# Patient Record
Sex: FEMALE | Race: WHITE | Employment: PART TIME | URBAN - METROPOLITAN AREA
[De-identification: names, ages, dates, MRNs, and addresses within clinical notes are randomized per-mention and may not be internally consistent; named-entity substitution may affect disease eponyms.]

---

## 2017-11-18 LAB
CHOLESTEROL, TOTAL: 174 MG/DL
CHOLESTEROL/HDL RATIO: NORMAL
HDLC SERPL-MCNC: 74 MG/DL (ref 35–70)
LDL CHOLESTEROL CALCULATED: 84 MG/DL (ref 0–160)
TRIGL SERPL-MCNC: 81 MG/DL
VLDLC SERPL CALC-MCNC: NORMAL MG/DL

## 2018-04-10 VITALS
HEART RATE: 74 BPM | BODY MASS INDEX: 22.49 KG/M2 | TEMPERATURE: 98.1 F | DIASTOLIC BLOOD PRESSURE: 80 MMHG | HEIGHT: 65 IN | WEIGHT: 135 LBS | SYSTOLIC BLOOD PRESSURE: 118 MMHG

## 2018-04-10 DIAGNOSIS — E78.5 HYPERLIPIDEMIA, UNSPECIFIED HYPERLIPIDEMIA TYPE: ICD-10-CM

## 2018-04-10 RX ORDER — SIMVASTATIN 5 MG
5 TABLET ORAL NIGHTLY
COMMUNITY
End: 2018-06-29 | Stop reason: SDUPTHER

## 2018-04-10 RX ORDER — ASCORBIC ACID 500 MG
500 TABLET ORAL DAILY
COMMUNITY

## 2018-06-25 ENCOUNTER — HOSPITAL ENCOUNTER (OUTPATIENT)
Age: 58
Discharge: HOME | End: 2018-06-25
Payer: COMMERCIAL

## 2018-06-25 LAB
ALBUMIN SERPL-MCNC: 4.3 G/DL (ref 3.5–5.2)
ALP BLD-CCNC: 55 U/L (ref 35–104)
ALT SERPL-CCNC: 22 U/L (ref 0–32)
ANION GAP SERPL CALCULATED.3IONS-SCNC: 7 MMOL/L (ref 7–16)
AST SERPL-CCNC: 26 U/L (ref 0–31)
BASOPHILS ABSOLUTE: 0.03 E9/L (ref 0–0.2)
BASOPHILS RELATIVE PERCENT: 0.7 % (ref 0–2)
BILIRUB SERPL-MCNC: 0.4 MG/DL (ref 0–1.2)
BUN BLDV-MCNC: 6 MG/DL (ref 6–20)
CALCIUM SERPL-MCNC: 9.4 MG/DL (ref 8.6–10.2)
CHLORIDE BLD-SCNC: 102 MMOL/L (ref 98–107)
CHOLESTEROL, FASTING: 171 MG/DL (ref 0–199)
CO2: 31 MMOL/L (ref 22–29)
CREAT SERPL-MCNC: 0.8 MG/DL (ref 0.5–1)
EOSINOPHILS ABSOLUTE: 0.15 E9/L (ref 0.05–0.5)
EOSINOPHILS RELATIVE PERCENT: 3.4 % (ref 0–6)
GFR AFRICAN AMERICAN: >60
GFR NON-AFRICAN AMERICAN: >60 ML/MIN/1.73
GLUCOSE FASTING: 104 MG/DL (ref 74–109)
HCT VFR BLD CALC: 36 % (ref 34–48)
HDLC SERPL-MCNC: 76 MG/DL
HEMOGLOBIN: 11.7 G/DL (ref 11.5–15.5)
IMMATURE GRANULOCYTES #: 0.01 E9/L
IMMATURE GRANULOCYTES %: 0.2 % (ref 0–5)
LDL CHOLESTEROL CALCULATED: 81 MG/DL (ref 0–99)
LYMPHOCYTES ABSOLUTE: 1.96 E9/L (ref 1.5–4)
LYMPHOCYTES RELATIVE PERCENT: 45 % (ref 20–42)
MCH RBC QN AUTO: 29.8 PG (ref 26–35)
MCHC RBC AUTO-ENTMCNC: 32.5 % (ref 32–34.5)
MCV RBC AUTO: 91.8 FL (ref 80–99.9)
MONOCYTES ABSOLUTE: 0.42 E9/L (ref 0.1–0.95)
MONOCYTES RELATIVE PERCENT: 9.6 % (ref 2–12)
NEUTROPHILS ABSOLUTE: 1.79 E9/L (ref 1.8–7.3)
NEUTROPHILS RELATIVE PERCENT: 41.1 % (ref 43–80)
PDW BLD-RTO: 12.8 FL (ref 11.5–15)
PLATELET # BLD: 227 E9/L (ref 130–450)
PMV BLD AUTO: 9 FL (ref 7–12)
POTASSIUM SERPL-SCNC: 4.1 MMOL/L (ref 3.5–5)
RBC # BLD: 3.92 E12/L (ref 3.5–5.5)
SODIUM BLD-SCNC: 140 MMOL/L (ref 132–146)
TOTAL PROTEIN: 7 G/DL (ref 6.4–8.3)
TRIGLYCERIDE, FASTING: 72 MG/DL (ref 0–149)
VLDLC SERPL CALC-MCNC: 14 MG/DL
WBC # BLD: 4.4 E9/L (ref 4.5–11.5)

## 2018-06-25 PROCEDURE — 36415 COLL VENOUS BLD VENIPUNCTURE: CPT

## 2018-06-25 PROCEDURE — 80061 LIPID PANEL: CPT

## 2018-06-25 PROCEDURE — 80053 COMPREHEN METABOLIC PANEL: CPT

## 2018-06-25 PROCEDURE — 85025 COMPLETE CBC W/AUTO DIFF WBC: CPT

## 2018-06-29 ENCOUNTER — OFFICE VISIT (OUTPATIENT)
Dept: PRIMARY CARE CLINIC | Age: 58
End: 2018-06-29
Payer: COMMERCIAL

## 2018-06-29 VITALS
SYSTOLIC BLOOD PRESSURE: 104 MMHG | OXYGEN SATURATION: 97 % | WEIGHT: 137 LBS | HEART RATE: 84 BPM | BODY MASS INDEX: 22.82 KG/M2 | HEIGHT: 65 IN | DIASTOLIC BLOOD PRESSURE: 66 MMHG

## 2018-06-29 DIAGNOSIS — E78.5 HYPERLIPIDEMIA, UNSPECIFIED HYPERLIPIDEMIA TYPE: Primary | ICD-10-CM

## 2018-06-29 PROCEDURE — 99213 OFFICE O/P EST LOW 20 MIN: CPT | Performed by: FAMILY MEDICINE

## 2018-06-29 RX ORDER — SIMVASTATIN 5 MG
5 TABLET ORAL NIGHTLY
Qty: 90 TABLET | Refills: 0 | Status: SHIPPED | OUTPATIENT
Start: 2018-06-29 | End: 2018-07-03 | Stop reason: SDUPTHER

## 2018-06-29 ASSESSMENT — ENCOUNTER SYMPTOMS
EYE DISCHARGE: 0
HEMOPTYSIS: 0
NAUSEA: 0
BLURRED VISION: 0
ABDOMINAL PAIN: 0
ORTHOPNEA: 0
SHORTNESS OF BREATH: 0
BLOOD IN STOOL: 0

## 2018-06-29 ASSESSMENT — PATIENT HEALTH QUESTIONNAIRE - PHQ9
1. LITTLE INTEREST OR PLEASURE IN DOING THINGS: 0
SUM OF ALL RESPONSES TO PHQ QUESTIONS 1-9: 0
2. FEELING DOWN, DEPRESSED OR HOPELESS: 0
SUM OF ALL RESPONSES TO PHQ9 QUESTIONS 1 & 2: 0

## 2018-07-03 DIAGNOSIS — E78.5 HYPERLIPIDEMIA, UNSPECIFIED HYPERLIPIDEMIA TYPE: Primary | ICD-10-CM

## 2018-07-03 RX ORDER — SIMVASTATIN 5 MG
5 TABLET ORAL NIGHTLY
Qty: 90 TABLET | Refills: 1 | Status: SHIPPED | OUTPATIENT
Start: 2018-07-03 | End: 2018-08-17 | Stop reason: SDUPTHER

## 2018-08-17 DIAGNOSIS — E78.5 HYPERLIPIDEMIA, UNSPECIFIED HYPERLIPIDEMIA TYPE: ICD-10-CM

## 2018-08-17 RX ORDER — SIMVASTATIN 5 MG
5 TABLET ORAL NIGHTLY
Qty: 90 TABLET | Refills: 1 | Status: SHIPPED | OUTPATIENT
Start: 2018-08-17 | End: 2019-03-14 | Stop reason: SDUPTHER

## 2018-08-17 NOTE — TELEPHONE ENCOUNTER
Received call requesting refill   Last O 6/29/18. Will be scheduling follow up, but will run out of meds before then .

## 2018-11-20 DIAGNOSIS — Z12.11 SCREENING FOR COLON CANCER: Primary | ICD-10-CM

## 2018-12-15 LAB
ALBUMIN SERPL-MCNC: 4.2 G/DL
ALP BLD-CCNC: 57 U/L
ALT SERPL-CCNC: 32 U/L
ANION GAP SERPL CALCULATED.3IONS-SCNC: NORMAL MMOL/L
AST SERPL-CCNC: 26 U/L
BASOPHILS ABSOLUTE: ABNORMAL /ΜL
BASOPHILS RELATIVE PERCENT: ABNORMAL %
BILIRUB SERPL-MCNC: 0.5 MG/DL (ref 0.1–1.4)
BUN BLDV-MCNC: 7 MG/DL
CALCIUM SERPL-MCNC: 8.7 MG/DL
CHLORIDE BLD-SCNC: 104 MMOL/L
CHOLESTEROL, TOTAL: 169 MG/DL
CHOLESTEROL/HDL RATIO: 2.4
CO2: 29 MMOL/L
CREAT SERPL-MCNC: 0.7 MG/DL
EOSINOPHILS ABSOLUTE: ABNORMAL /ΜL
EOSINOPHILS RELATIVE PERCENT: ABNORMAL %
GFR CALCULATED: >60
GLUCOSE BLD-MCNC: 84 MG/DL
HCT VFR BLD CALC: 35.6 % (ref 36–46)
HDLC SERPL-MCNC: 69 MG/DL (ref 35–70)
HEMOGLOBIN: 11.5 G/DL (ref 12–16)
LDL CHOLESTEROL CALCULATED: 87 MG/DL (ref 0–160)
LYMPHOCYTES ABSOLUTE: ABNORMAL /ΜL
LYMPHOCYTES RELATIVE PERCENT: ABNORMAL %
MCH RBC QN AUTO: ABNORMAL PG
MCHC RBC AUTO-ENTMCNC: ABNORMAL G/DL
MCV RBC AUTO: ABNORMAL FL
MONOCYTES ABSOLUTE: ABNORMAL /ΜL
MONOCYTES RELATIVE PERCENT: ABNORMAL %
NEUTROPHILS ABSOLUTE: ABNORMAL /ΜL
NEUTROPHILS RELATIVE PERCENT: ABNORMAL %
PLATELET # BLD: 261 K/ΜL
PMV BLD AUTO: ABNORMAL FL
POTASSIUM SERPL-SCNC: 3.8 MMOL/L
RBC # BLD: 3.86 10^6/ΜL
SODIUM BLD-SCNC: 140 MMOL/L
TOTAL PROTEIN: 6.9
TRIGL SERPL-MCNC: 67 MG/DL
VLDLC SERPL CALC-MCNC: NORMAL MG/DL
WBC # BLD: 4.2 10^3/ML

## 2018-12-17 DIAGNOSIS — E78.5 HYPERLIPIDEMIA, UNSPECIFIED HYPERLIPIDEMIA TYPE: ICD-10-CM

## 2018-12-18 RX ORDER — AZELASTINE HYDROCHLORIDE 0.5 MG/ML
SOLUTION/ DROPS OPHTHALMIC
Refills: 0 | COMMUNITY
Start: 2018-11-17 | End: 2018-12-20

## 2018-12-20 ENCOUNTER — OFFICE VISIT (OUTPATIENT)
Dept: PRIMARY CARE CLINIC | Age: 58
End: 2018-12-20
Payer: COMMERCIAL

## 2018-12-20 VITALS
WEIGHT: 136 LBS | SYSTOLIC BLOOD PRESSURE: 130 MMHG | BODY MASS INDEX: 22.63 KG/M2 | HEART RATE: 76 BPM | TEMPERATURE: 97.8 F | DIASTOLIC BLOOD PRESSURE: 70 MMHG

## 2018-12-20 DIAGNOSIS — Z23 NEED FOR PROPHYLACTIC VACCINATION AND INOCULATION AGAINST VARICELLA: Primary | ICD-10-CM

## 2018-12-20 DIAGNOSIS — E78.5 HYPERLIPIDEMIA, UNSPECIFIED HYPERLIPIDEMIA TYPE: ICD-10-CM

## 2018-12-20 DIAGNOSIS — D50.8 IRON DEFICIENCY ANEMIA SECONDARY TO INADEQUATE DIETARY IRON INTAKE: ICD-10-CM

## 2018-12-20 PROCEDURE — 99213 OFFICE O/P EST LOW 20 MIN: CPT | Performed by: FAMILY MEDICINE

## 2018-12-20 ASSESSMENT — ENCOUNTER SYMPTOMS
RESPIRATORY NEGATIVE: 1
GASTROINTESTINAL NEGATIVE: 1
ALLERGIC/IMMUNOLOGIC NEGATIVE: 1
EYES NEGATIVE: 1

## 2018-12-20 NOTE — PATIENT INSTRUCTIONS
Take the medication as prescribed  Regular exercises  Follow with the gynecologist  Take the iron supplement  Return to clinic earlier if any problems

## 2018-12-20 NOTE — PROGRESS NOTES
OFFICE PROGRESS NOTE      SUBJECTIVE:        Patient ID:   Sherlynn Bence is a 60 y.o. female who presents for   Chief Complaint   Patient presents with    Hyperlipidemia     Here for recheck on Hyperlipidemia. Patient reports feeling well without any complaints. Lab work done,here for review. HPI:     Patient status is feeling good  Has not been taking iron pills  Not eating properly  Lab work shows anemia  Patient for GI workup were normal  Has been followed up by the gynecologist      Prior to Admission medications    Medication Sig Start Date End Date Taking? Authorizing Provider   simvastatin (ZOCOR) 5 MG tablet Take 1 tablet by mouth nightly 8/17/18 2/13/19 Yes Grupo Judge MD   aspirin 81 MG tablet Take 81 mg by mouth daily   Yes Historical Provider, MD   vitamin C (ASCORBIC ACID) 500 MG tablet Take 500 mg by mouth daily   Yes Historical Provider, MD   vitamin D (CHOLECALCIFEROL) 1000 UNIT TABS tablet Take 1,000 Units by mouth daily   Yes Historical Provider, MD     Social History     Social History    Marital status:      Spouse name: N/A    Number of children: N/A    Years of education: N/A     Social History Main Topics    Smoking status: Never Smoker    Smokeless tobacco: Never Used    Alcohol use No    Drug use: No    Sexual activity: Yes     Partners: Male     Other Topics Concern    None     Social History Narrative    None       I have reviewed Yocasta's allergies, medications, problem list, medical, social and family history and have updated as needed in the electronic medical record  Review Of Systems:    Review of Systems   Constitutional: Negative. HENT: Negative. Eyes: Negative. Respiratory: Negative. Cardiovascular: Negative. Gastrointestinal: Negative. Endocrine: Negative. Musculoskeletal: Negative. Skin: Negative. Allergic/Immunologic: Negative. Neurological: Negative.     Hematological:

## 2019-03-08 DIAGNOSIS — Z12.11 SCREEN FOR COLON CANCER: Primary | ICD-10-CM

## 2019-03-08 LAB
CONTROL: NORMAL
HEMOCCULT STL QL: NEGATIVE

## 2019-03-08 PROCEDURE — 82274 ASSAY TEST FOR BLOOD FECAL: CPT | Performed by: FAMILY MEDICINE

## 2019-03-12 DIAGNOSIS — E78.5 HYPERLIPIDEMIA, UNSPECIFIED HYPERLIPIDEMIA TYPE: ICD-10-CM

## 2019-03-14 RX ORDER — SIMVASTATIN 5 MG
5 TABLET ORAL NIGHTLY
Qty: 90 TABLET | Refills: 1 | Status: SHIPPED | OUTPATIENT
Start: 2019-03-14 | End: 2019-08-26 | Stop reason: SDUPTHER

## 2019-06-08 LAB
ALBUMIN SERPL-MCNC: 4.2 G/DL
ALP BLD-CCNC: 52 U/L
ALT SERPL-CCNC: 28 U/L
ANION GAP SERPL CALCULATED.3IONS-SCNC: NORMAL MMOL/L
AST SERPL-CCNC: 23 U/L
BASOPHILS ABSOLUTE: ABNORMAL /ΜL
BASOPHILS RELATIVE PERCENT: ABNORMAL %
BILIRUB SERPL-MCNC: 0.4 MG/DL (ref 0.1–1.4)
BUN BLDV-MCNC: 10 MG/DL
CALCIUM SERPL-MCNC: 8.9 MG/DL
CHLORIDE BLD-SCNC: 104 MMOL/L
CHOLESTEROL, TOTAL: 169 MG/DL
CHOLESTEROL/HDL RATIO: NORMAL
CO2: 31 MMOL/L
CREAT SERPL-MCNC: 0.84 MG/DL
EOSINOPHILS ABSOLUTE: ABNORMAL /ΜL
EOSINOPHILS RELATIVE PERCENT: ABNORMAL %
GFR CALCULATED: >60
GLUCOSE BLD-MCNC: 90 MG/DL
HCT VFR BLD CALC: 36.5 % (ref 36–46)
HDLC SERPL-MCNC: 68 MG/DL (ref 35–70)
HEMOGLOBIN: 11.8 G/DL (ref 12–16)
LDL CHOLESTEROL CALCULATED: 88 MG/DL (ref 0–160)
LYMPHOCYTES ABSOLUTE: ABNORMAL /ΜL
LYMPHOCYTES RELATIVE PERCENT: ABNORMAL %
MCH RBC QN AUTO: ABNORMAL PG
MCHC RBC AUTO-ENTMCNC: ABNORMAL G/DL
MCV RBC AUTO: ABNORMAL FL
MONOCYTES ABSOLUTE: ABNORMAL /ΜL
MONOCYTES RELATIVE PERCENT: ABNORMAL %
NEUTROPHILS ABSOLUTE: ABNORMAL /ΜL
NEUTROPHILS RELATIVE PERCENT: ABNORMAL %
PLATELET # BLD: 286 K/ΜL
PMV BLD AUTO: ABNORMAL FL
POTASSIUM SERPL-SCNC: 4.1 MMOL/L
RBC # BLD: ABNORMAL 10^6/ΜL
SODIUM BLD-SCNC: 140 MMOL/L
TOTAL PROTEIN: 6.8
TRIGL SERPL-MCNC: 65 MG/DL
VLDLC SERPL CALC-MCNC: NORMAL MG/DL
WBC # BLD: 5 10^3/ML

## 2019-06-10 DIAGNOSIS — E78.5 HYPERLIPIDEMIA, UNSPECIFIED HYPERLIPIDEMIA TYPE: ICD-10-CM

## 2019-06-10 DIAGNOSIS — D50.8 IRON DEFICIENCY ANEMIA SECONDARY TO INADEQUATE DIETARY IRON INTAKE: ICD-10-CM

## 2019-06-20 ENCOUNTER — OFFICE VISIT (OUTPATIENT)
Dept: PRIMARY CARE CLINIC | Age: 59
End: 2019-06-20
Payer: COMMERCIAL

## 2019-06-20 VITALS
BODY MASS INDEX: 22.3 KG/M2 | HEART RATE: 68 BPM | TEMPERATURE: 97.4 F | WEIGHT: 134 LBS | SYSTOLIC BLOOD PRESSURE: 120 MMHG | DIASTOLIC BLOOD PRESSURE: 80 MMHG

## 2019-06-20 DIAGNOSIS — D50.8 IRON DEFICIENCY ANEMIA SECONDARY TO INADEQUATE DIETARY IRON INTAKE: ICD-10-CM

## 2019-06-20 DIAGNOSIS — E78.5 HYPERLIPIDEMIA, UNSPECIFIED HYPERLIPIDEMIA TYPE: Primary | ICD-10-CM

## 2019-06-20 PROCEDURE — 99213 OFFICE O/P EST LOW 20 MIN: CPT | Performed by: FAMILY MEDICINE

## 2019-06-20 ASSESSMENT — PATIENT HEALTH QUESTIONNAIRE - PHQ9
SUM OF ALL RESPONSES TO PHQ9 QUESTIONS 1 & 2: 0
1. LITTLE INTEREST OR PLEASURE IN DOING THINGS: 0
2. FEELING DOWN, DEPRESSED OR HOPELESS: 0
SUM OF ALL RESPONSES TO PHQ QUESTIONS 1-9: 0
SUM OF ALL RESPONSES TO PHQ QUESTIONS 1-9: 0

## 2019-06-20 ASSESSMENT — ENCOUNTER SYMPTOMS
EYES NEGATIVE: 1
GASTROINTESTINAL NEGATIVE: 1
ALLERGIC/IMMUNOLOGIC NEGATIVE: 1
RESPIRATORY NEGATIVE: 1

## 2019-06-20 NOTE — PROGRESS NOTES
06/08/2019    HGB 11.8 (A) 06/08/2019    HCT 36.5 06/08/2019     06/08/2019    CHOL 169 06/08/2019    TRIG 65 06/08/2019    HDL 68 06/08/2019    ALT 28 06/08/2019    AST 23 06/08/2019     06/08/2019    K 4.1 06/08/2019     06/08/2019    CREATININE 0.84 06/08/2019    BUN 10 06/08/2019    CO2 31 06/08/2019    GLUF 104 06/25/2018     No results found for: VOL, APPEARANCE, COLORU, LABSPEC, LABPH, LEUKBLD, NITRU, GLUCOSEU, KETUA, UROBILINOGEN, KETUA, UROBILINOGEN, BILIRUBINUR, OCBU  No results found for: PSA, CEA, , [id number],       Controlled Substances Monitoring:                                    ASSESSMENT     Patient Active Problem List    Diagnosis Date Noted    Iron deficiency anemia secondary to inadequate dietary iron intake 12/20/2018    Hyperlipidemia 04/10/2018        Diagnosis:     ICD-10-CM    1. Hyperlipidemia, unspecified hyperlipidemia type E78.5 COMPREHENSIVE METABOLIC PANEL     LIPID PANEL     Comprehensive Metabolic Panel     Lipid Panel   2. Iron deficiency anemia secondary to inadequate dietary iron intake D50.8 CBC WITH AUTO DIFFERENTIAL     CBC WITH AUTO DIFFERENTIAL       PLAN:     Continue present treatment  Follow with GYN  Supplement iron  Low-sodium low-fat diet  Regular exercises      Patient Instructions   Continue present treatment  Low-fat diet  Regular exercises  Follow with the GYN  Return to the clinic earlier if any problems      Return in about 3 months (around 9/20/2019). Rebecca Galvan reviewed my findings and recommendations with Serg Alvarado.     Electronicallysigned by Shilpa Carlson MD on 6/20/19 at 2:10 PM

## 2019-06-20 NOTE — PATIENT INSTRUCTIONS
Continue present treatment  Low-fat diet  Regular exercises  Follow with the GYN  Return to the clinic earlier if any problems

## 2019-08-26 DIAGNOSIS — E78.5 HYPERLIPIDEMIA, UNSPECIFIED HYPERLIPIDEMIA TYPE: ICD-10-CM

## 2019-08-26 RX ORDER — SIMVASTATIN 5 MG
5 TABLET ORAL NIGHTLY
Qty: 90 TABLET | Refills: 1 | Status: SHIPPED
Start: 2019-08-26 | End: 2020-02-19 | Stop reason: SDUPTHER

## 2019-10-05 ENCOUNTER — HOSPITAL ENCOUNTER (OUTPATIENT)
Age: 59
Discharge: HOME | End: 2019-10-07
Payer: COMMERCIAL

## 2019-10-05 DIAGNOSIS — D50.8 IRON DEFICIENCY ANEMIA SECONDARY TO INADEQUATE DIETARY IRON INTAKE: ICD-10-CM

## 2019-10-05 DIAGNOSIS — E78.5 HYPERLIPIDEMIA, UNSPECIFIED HYPERLIPIDEMIA TYPE: ICD-10-CM

## 2019-10-05 LAB
ALBUMIN SERPL-MCNC: 4.7 G/DL (ref 3.5–5.2)
ALP BLD-CCNC: 56 U/L (ref 35–104)
ALT SERPL-CCNC: 23 U/L (ref 0–32)
ANION GAP SERPL CALCULATED.3IONS-SCNC: 14 MMOL/L (ref 7–16)
AST SERPL-CCNC: 27 U/L (ref 0–31)
BASOPHILS ABSOLUTE: 0.05 E9/L (ref 0–0.2)
BASOPHILS RELATIVE PERCENT: 1.2 % (ref 0–2)
BILIRUB SERPL-MCNC: 0.4 MG/DL (ref 0–1.2)
BUN BLDV-MCNC: 7 MG/DL (ref 6–20)
CALCIUM SERPL-MCNC: 9.7 MG/DL (ref 8.6–10.2)
CHLORIDE BLD-SCNC: 103 MMOL/L (ref 98–107)
CHOLESTEROL, TOTAL: 181 MG/DL (ref 0–199)
CO2: 26 MMOL/L (ref 22–29)
CREAT SERPL-MCNC: 0.8 MG/DL (ref 0.5–1)
EOSINOPHILS ABSOLUTE: 0.12 E9/L (ref 0.05–0.5)
EOSINOPHILS RELATIVE PERCENT: 3 % (ref 0–6)
GFR AFRICAN AMERICAN: >60
GFR NON-AFRICAN AMERICAN: >60 ML/MIN/1.73
GLUCOSE BLD-MCNC: 99 MG/DL (ref 74–99)
HCT VFR BLD CALC: 39.6 % (ref 34–48)
HDLC SERPL-MCNC: 68 MG/DL
HEMOGLOBIN: 12.3 G/DL (ref 11.5–15.5)
IMMATURE GRANULOCYTES #: 0.01 E9/L
IMMATURE GRANULOCYTES %: 0.2 % (ref 0–5)
LDL CHOLESTEROL CALCULATED: 93 MG/DL (ref 0–99)
LYMPHOCYTES ABSOLUTE: 1.48 E9/L (ref 1.5–4)
LYMPHOCYTES RELATIVE PERCENT: 36.5 % (ref 20–42)
MCH RBC QN AUTO: 29.9 PG (ref 26–35)
MCHC RBC AUTO-ENTMCNC: 31.1 % (ref 32–34.5)
MCV RBC AUTO: 96.4 FL (ref 80–99.9)
MONOCYTES ABSOLUTE: 0.31 E9/L (ref 0.1–0.95)
MONOCYTES RELATIVE PERCENT: 7.7 % (ref 2–12)
NEUTROPHILS ABSOLUTE: 2.08 E9/L (ref 1.8–7.3)
NEUTROPHILS RELATIVE PERCENT: 51.4 % (ref 43–80)
PDW BLD-RTO: 12.9 FL (ref 11.5–15)
PLATELET # BLD: 288 E9/L (ref 130–450)
PMV BLD AUTO: 10.3 FL (ref 7–12)
POTASSIUM SERPL-SCNC: 5 MMOL/L (ref 3.5–5)
RBC # BLD: 4.11 E12/L (ref 3.5–5.5)
SODIUM BLD-SCNC: 143 MMOL/L (ref 132–146)
TOTAL PROTEIN: 7.6 G/DL (ref 6.4–8.3)
TRIGL SERPL-MCNC: 100 MG/DL (ref 0–149)
VLDLC SERPL CALC-MCNC: 20 MG/DL
WBC # BLD: 4.1 E9/L (ref 4.5–11.5)

## 2019-10-05 PROCEDURE — 80061 LIPID PANEL: CPT

## 2019-10-05 PROCEDURE — 36415 COLL VENOUS BLD VENIPUNCTURE: CPT

## 2019-10-05 PROCEDURE — 85025 COMPLETE CBC W/AUTO DIFF WBC: CPT

## 2019-10-05 PROCEDURE — 80053 COMPREHEN METABOLIC PANEL: CPT

## 2019-10-10 ENCOUNTER — OFFICE VISIT (OUTPATIENT)
Dept: PRIMARY CARE CLINIC | Age: 59
End: 2019-10-10
Payer: COMMERCIAL

## 2019-10-10 VITALS
SYSTOLIC BLOOD PRESSURE: 120 MMHG | WEIGHT: 135 LBS | HEART RATE: 76 BPM | DIASTOLIC BLOOD PRESSURE: 80 MMHG | HEIGHT: 65 IN | TEMPERATURE: 98.4 F | BODY MASS INDEX: 22.49 KG/M2

## 2019-10-10 DIAGNOSIS — D50.8 IRON DEFICIENCY ANEMIA SECONDARY TO INADEQUATE DIETARY IRON INTAKE: ICD-10-CM

## 2019-10-10 DIAGNOSIS — E78.5 HYPERLIPIDEMIA, UNSPECIFIED HYPERLIPIDEMIA TYPE: ICD-10-CM

## 2019-10-10 DIAGNOSIS — Z23 NEED FOR PROPHYLACTIC VACCINATION AND INOCULATION AGAINST INFLUENZA: Primary | ICD-10-CM

## 2019-10-10 PROCEDURE — 99214 OFFICE O/P EST MOD 30 MIN: CPT | Performed by: FAMILY MEDICINE

## 2019-10-10 PROCEDURE — 90471 IMMUNIZATION ADMIN: CPT | Performed by: FAMILY MEDICINE

## 2019-10-10 PROCEDURE — 90686 IIV4 VACC NO PRSV 0.5 ML IM: CPT | Performed by: FAMILY MEDICINE

## 2019-10-10 ASSESSMENT — ENCOUNTER SYMPTOMS
RESPIRATORY NEGATIVE: 1
GASTROINTESTINAL NEGATIVE: 1
EYES NEGATIVE: 1
ALLERGIC/IMMUNOLOGIC NEGATIVE: 1

## 2020-02-18 ENCOUNTER — TELEPHONE (OUTPATIENT)
Dept: ADMINISTRATIVE | Age: 60
End: 2020-02-18

## 2020-02-18 NOTE — TELEPHONE ENCOUNTER
Elva Sanders called stating that she will be needing med refill for Zocor. She is scheduled for 4/6. Please call her back at [phone] (h), ok to LM she says.

## 2020-02-19 RX ORDER — SIMVASTATIN 5 MG
5 TABLET ORAL NIGHTLY
Qty: 90 TABLET | Refills: 1 | Status: SHIPPED
Start: 2020-02-19 | End: 2020-05-06

## 2020-02-19 RX ORDER — SIMVASTATIN 5 MG
5 TABLET ORAL NIGHTLY
Qty: 90 TABLET | Refills: 1 | Status: SHIPPED | OUTPATIENT
Start: 2020-02-19 | End: 2020-05-06 | Stop reason: SDUPTHER

## 2020-03-30 ENCOUNTER — TELEPHONE (OUTPATIENT)
Dept: ADMINISTRATIVE | Age: 60
End: 2020-03-30

## 2020-03-30 NOTE — TELEPHONE ENCOUNTER
Pt and her son China Will,  90) have appts scheduled for . She was unsure if these appts are going to be cancelled or not. She did not know if they should have their lab drawn this week. If appts are going to be cancelled, she does not want to have lab done now. Please advise and contact pt.

## 2020-04-30 LAB
ALBUMIN SERPL-MCNC: 3.8 G/DL
ALP BLD-CCNC: 52 U/L
ALT SERPL-CCNC: 27 U/L
ANION GAP SERPL CALCULATED.3IONS-SCNC: 8 MMOL/L
AST SERPL-CCNC: 21 U/L
BASOPHILS ABSOLUTE: 0 /ΜL
BASOPHILS RELATIVE PERCENT: 1 %
BILIRUB SERPL-MCNC: 0.4 MG/DL (ref 0.1–1.4)
BUN BLDV-MCNC: 12 MG/DL
CALCIUM SERPL-MCNC: 8.6 MG/DL
CHLORIDE BLD-SCNC: 108 MMOL/L
CHOLESTEROL, TOTAL: 184 MG/DL
CHOLESTEROL/HDL RATIO: 3
CO2: 28 MMOL/L
CREAT SERPL-MCNC: 0.72 MG/DL
EOSINOPHILS ABSOLUTE: 0.1 /ΜL
EOSINOPHILS RELATIVE PERCENT: 3.2 %
GFR CALCULATED: NORMAL
GLUCOSE BLD-MCNC: 97 MG/DL
HCT VFR BLD CALC: 34.4 % (ref 36–46)
HDLC SERPL-MCNC: 67 MG/DL (ref 35–70)
HEMOGLOBIN: 11.6 G/DL (ref 12–16)
LDL CHOLESTEROL CALCULATED: 102 MG/DL (ref 0–160)
LYMPHOCYTES ABSOLUTE: 1.9 /ΜL
LYMPHOCYTES RELATIVE PERCENT: 43.3 %
MCH RBC QN AUTO: 30.6 PG
MCHC RBC AUTO-ENTMCNC: 33.6 G/DL
MCV RBC AUTO: 91.3 FL
MONOCYTES ABSOLUTE: 0.4 /ΜL
MONOCYTES RELATIVE PERCENT: 8.2 %
NEUTROPHILS ABSOLUTE: 2 /ΜL
NEUTROPHILS RELATIVE PERCENT: 44.3 %
PDW BLD-RTO: 13.3 %
PLATELET # BLD: 270 K/ΜL
PMV BLD AUTO: 8.9 FL
POTASSIUM SERPL-SCNC: 3.9 MMOL/L
RBC # BLD: 3.77 10^6/ΜL
SODIUM BLD-SCNC: 140 MMOL/L
TOTAL PROTEIN: 6.6
TRIGL SERPL-MCNC: 74 MG/DL
VLDLC SERPL CALC-MCNC: NORMAL MG/DL
WBC # BLD: 4.5 10^3/ML

## 2020-05-06 ENCOUNTER — OFFICE VISIT (OUTPATIENT)
Dept: FAMILY MEDICINE CLINIC | Age: 60
End: 2020-05-06
Payer: COMMERCIAL

## 2020-05-06 VITALS
TEMPERATURE: 98.4 F | WEIGHT: 134 LBS | HEART RATE: 74 BPM | OXYGEN SATURATION: 99 % | SYSTOLIC BLOOD PRESSURE: 110 MMHG | BODY MASS INDEX: 22.47 KG/M2 | DIASTOLIC BLOOD PRESSURE: 68 MMHG

## 2020-05-06 PROCEDURE — 99213 OFFICE O/P EST LOW 20 MIN: CPT | Performed by: FAMILY MEDICINE

## 2020-05-06 RX ORDER — SIMVASTATIN 5 MG
5 TABLET ORAL NIGHTLY
Qty: 90 TABLET | Refills: 1 | Status: SHIPPED | OUTPATIENT
Start: 2020-05-06 | End: 2020-08-31 | Stop reason: SDUPTHER

## 2020-05-06 ASSESSMENT — PATIENT HEALTH QUESTIONNAIRE - PHQ9
1. LITTLE INTEREST OR PLEASURE IN DOING THINGS: 0
SUM OF ALL RESPONSES TO PHQ QUESTIONS 1-9: 0
SUM OF ALL RESPONSES TO PHQ9 QUESTIONS 1 & 2: 0
SUM OF ALL RESPONSES TO PHQ QUESTIONS 1-9: 0
2. FEELING DOWN, DEPRESSED OR HOPELESS: 0

## 2020-05-06 ASSESSMENT — ENCOUNTER SYMPTOMS
EYES NEGATIVE: 1
ALLERGIC/IMMUNOLOGIC NEGATIVE: 1
RESPIRATORY NEGATIVE: 1
GASTROINTESTINAL NEGATIVE: 1

## 2020-05-06 NOTE — PATIENT INSTRUCTIONS
Continue present treatment  Low-fat diet  Follow-up with a gynecologist  Regular exercises  Return to clinic earlier if any problem  General Recommendations for Routine Cleaning and Disinfection of Households  Community members can practice routine cleaning of frequently touched surfaces (for example: tables, doorknobs, light switches, handles, desks, toilets, faucets, sinks) with household  and EPA-registered disinfectants that are appropriate for the surface, following label instructions. Labels contain instructions for safe and effective use of the cleaning product including precautions you should take when applying the product, such as wearing gloves and making sure you have good ventilation during use of the product. These guidelines are focused on household settings and are meant for the general public.  Cleaning refers to the removal of germs, dirt, and impurities from surfaces. Cleaning does not kill germs, but by removing them, it lowers their numbers and the risk of spreading infection.  Disinfecting refers to using chemicals to kill germs on surfaces. This process does not necessarily clean dirty surfaces or remove germs, but by killing germs on a surface after cleaning, it can further lower the risk of spreading infection. General Recommendations for Cleaning and Disinfection of Households with People Isolated in  nuPSYS - Confirmed or suspected [place] members should educate themselves about COVID-19 symptoms and preventing the spread of COVID-19 in homes.  Clean and disinfect high-touch surfaces daily in household common areas (e.g. tables, hard-backed chairs, doorknobs, light switches, remotes, handles, desks, toilets, sinks)   o In the bedroom/bathroom dedicated for an ill person: consider reducing cleaning frequency to as-needed (e.g., soiled items and surfaces) to avoid unnecessary contact with the ill person.    - As much as possible, an ill person should stay in a [email or web address]. html  [email or web address]-Coronavirus-Fighting-Products-List.pdf

## 2020-05-06 NOTE — PROGRESS NOTES
OFFICE PROGRESS NOTE      SUBJECTIVE:        Patient ID:   Lavell Reina is a 60 y.o. female who presents for   Chief Complaint   Patient presents with    Hyperlipidemia     Here for recheck on Hyperlipidemia. Patient reports feeling well. Lab work done,here for review. HPI:   Patient states is feeling okay  Lab work showed low hemoglobin  Patient not taking her iron supplement as prescribed  Lipids are normal  Patient does follow the diet and exercise  Has been followed by the gynecologist        Prior to Visit Medications    Medication Sig Taking?  Authorizing Provider   Multiple Vitamins-Iron (MULTIVITAMIN/IRON PO) Take 1 tablet by mouth daily Yes Historical Provider, MD   simvastatin (ZOCOR) 5 MG tablet Take 1 tablet by mouth nightly Yes Grzegorz Pulliam MD   aspirin 81 MG tablet Take 81 mg by mouth daily Yes Historical Provider, MD   vitamin C (ASCORBIC ACID) 500 MG tablet Take 500 mg by mouth daily Yes Historical Provider, MD   vitamin D (CHOLECALCIFEROL) 1000 UNIT TABS tablet Take 1,000 Units by mouth daily Yes Historical Provider, MD      Social History     Socioeconomic History    Marital status:      Spouse name: None    Number of children: None    Years of education: None    Highest education level: None   Occupational History    None   Social Needs    Financial resource strain: None    Food insecurity     Worry: None     Inability: None    Transportation needs     Medical: None     Non-medical: None   Tobacco Use    Smoking status: Never Smoker    Smokeless tobacco: Never Used   Substance and Sexual Activity    Alcohol use: No    Drug use: No    Sexual activity: Yes     Partners: Male   Lifestyle    Physical activity     Days per week: None     Minutes per session: None    Stress: None   Relationships    Social connections     Talks on phone: None     Gets together: None     Attends [employer] service: None     Active member of towels,  and EPA-registered disinfectants (see list link at [facility] website). - If a separate bathroom is not available, the bathroom should be cleaned and disinfected after each use by an ill person. If this is not possible, the caregiver should wait as long as practical after use by an ill person to clean and disinfect the high-touch surfaces. How to clean and disinfect:  Hard Surfaces   Wear disposable gloves when cleaning and disinfecting surfaces. Gloves should be discarded after each cleaning. If reusable gloves are used, those gloves should be dedicated for cleaning and disinfection of surfaces for COVID-19 and should not be used for other purposes. Consult the 's instructions for cleaning and disinfection products used. Clean hands immediately after gloves are removed.  If surfaces are dirty, they should be cleaned using a detergent or soap and water prior to disinfection.  For disinfection, diluted household bleach solutions, alcohol solutions with at least 70% alcohol, and most common EPA-registered household disinfectants should be effective.   o Diluted household bleach solutions can be used if appropriate for the surface. Follow 's instructions for application and proper ventilation. Check to ensure the product is not past its expiration date. Never mix household bleach with ammonia or any other cleanser. Unexpired household bleach will be effective against coronaviruses when properly diluted. - Prepare a bleach solution by mixing:   - 5 tablespoons (1/3rd cup) bleach per gallon of water or  - 4 teaspoons bleach per quart of water  o Products with EPA-approved emerging viral pathogens [facility] iconexternal icon are expected to be effective against COVID-19 based on data for harder to kill viruses. Follow the 's instructions for all cleaning and disinfection products (e.g., concentration, application method and contact time, etc.).   Soft (porous) surfaces such as carpeted floor, rugs, and drapes  Remove visible contamination if present and clean with appropriate  indicated for use on these surfaces. After cleaning: Launder items as appropriate in accordance with the 's instructions. If possible, launder items using the warmest appropriate water setting for the items and dry items completely, or    Clothing, towels, linens and other items that go in the laundry   Wear disposable gloves when handling dirty laundry from an ill person and then discard after each use. If using reusable gloves, those gloves should be dedicated for cleaning and disinfection of surfaces for COVID-19 and should not be used for other household purposes. Clean hands immediately after gloves are removed. o If no gloves are used when handling dirty laundry, be sure to wash hands afterwards. o If possible, do not shake dirty laundry. This will minimize the possibility of dispersing virus through the air.  o Launder items as appropriate in accordance with the 's instructions. If possible, launder items using the warmest appropriate water setting for the items and dry items completely. Dirty laundry from an ill person can be washed with other people's items. o Clean and disinfect clothes hampers according to guidance above for surfaces. If possible, consider placing a  that is either disposable (can be thrown away) or can be laundered. [facility] has a list of EPA approved cleaning products on their website - [employer]. html  [employer]/Novel-Coronavirus-Fighting-Products-List.pdf                Return in about 6 months (around 11/6/2020). Clyde Vital reviewed my findings and recommendations with Magdaleno Banuelos.     Electronicallysigned by Mary Beth Nagy MD on 5/6/20 at 9:53 AM EDT

## 2020-08-31 RX ORDER — SIMVASTATIN 5 MG
5 TABLET ORAL NIGHTLY
Qty: 90 TABLET | Refills: 1 | Status: SHIPPED
Start: 2020-08-31 | End: 2020-09-08 | Stop reason: SDUPTHER

## 2020-09-08 ENCOUNTER — TELEPHONE (OUTPATIENT)
Dept: FAMILY MEDICINE CLINIC | Age: 60
End: 2020-09-08

## 2020-09-08 RX ORDER — SIMVASTATIN 5 MG
5 TABLET ORAL NIGHTLY
Qty: 90 TABLET | Refills: 1 | Status: SHIPPED
Start: 2020-09-08 | End: 2021-01-19 | Stop reason: SDUPTHER

## 2020-09-23 LAB
ALBUMIN SERPL-MCNC: 4.6 G/DL
ALP BLD-CCNC: 49 U/L
ALT SERPL-CCNC: 19 U/L
ANION GAP SERPL CALCULATED.3IONS-SCNC: NORMAL MMOL/L
AST SERPL-CCNC: 22 U/L
BASOPHILS ABSOLUTE: 49 /ΜL
BASOPHILS RELATIVE PERCENT: 1 %
BILIRUB SERPL-MCNC: 0.5 MG/DL (ref 0.1–1.4)
BUN BLDV-MCNC: 9 MG/DL
CALCIUM SERPL-MCNC: 9.6 MG/DL
CHLORIDE BLD-SCNC: 104 MMOL/L
CHOLESTEROL, TOTAL: 175 MG/DL
CHOLESTEROL/HDL RATIO: 2.6
CO2: 32 MMOL/L
CREAT SERPL-MCNC: 0.83 MG/DL
EOSINOPHILS ABSOLUTE: 123 /ΜL
EOSINOPHILS RELATIVE PERCENT: 2.5 %
GFR CALCULATED: 96
GLUCOSE BLD-MCNC: 115 MG/DL
HCT VFR BLD CALC: 37.1 % (ref 36–46)
HDLC SERPL-MCNC: 67 MG/DL (ref 35–70)
HEMOGLOBIN: 12.2 G/DL (ref 12–16)
LDL CHOLESTEROL CALCULATED: 93 MG/DL (ref 0–160)
LYMPHOCYTES ABSOLUTE: 1906 /ΜL
LYMPHOCYTES RELATIVE PERCENT: 38.9 %
MCH RBC QN AUTO: 30.1 PG
MCHC RBC AUTO-ENTMCNC: 32.9 G/DL
MCV RBC AUTO: 91.6 FL
MONOCYTES ABSOLUTE: 451 /ΜL
MONOCYTES RELATIVE PERCENT: 9.2 %
NEUTROPHILS ABSOLUTE: 2372 /ΜL
NEUTROPHILS RELATIVE PERCENT: 48.4 %
NONHDLC SERPL-MCNC: 108 MG/DL
PDW BLD-RTO: 12.2 %
PLATELET # BLD: 295 K/ΜL
PMV BLD AUTO: 10.7 FL
POTASSIUM SERPL-SCNC: 4.3 MMOL/L
RBC # BLD: 4.05 10^6/ΜL
SODIUM BLD-SCNC: 141 MMOL/L
TOTAL PROTEIN: 6.8
TRIGL SERPL-MCNC: 63 MG/DL
VLDLC SERPL CALC-MCNC: NORMAL MG/DL
WBC # BLD: 4.9 10^3/ML

## 2020-09-28 ENCOUNTER — OFFICE VISIT (OUTPATIENT)
Dept: FAMILY MEDICINE CLINIC | Age: 60
End: 2020-09-28
Payer: COMMERCIAL

## 2020-09-28 VITALS
WEIGHT: 130 LBS | SYSTOLIC BLOOD PRESSURE: 123 MMHG | TEMPERATURE: 97 F | BODY MASS INDEX: 21.8 KG/M2 | DIASTOLIC BLOOD PRESSURE: 79 MMHG | HEART RATE: 69 BPM | OXYGEN SATURATION: 98 %

## 2020-09-28 PROCEDURE — 99213 OFFICE O/P EST LOW 20 MIN: CPT | Performed by: FAMILY MEDICINE

## 2020-09-28 PROCEDURE — 90471 IMMUNIZATION ADMIN: CPT | Performed by: FAMILY MEDICINE

## 2020-09-28 PROCEDURE — 90688 IIV4 VACCINE SPLT 0.5 ML IM: CPT | Performed by: FAMILY MEDICINE

## 2020-09-28 RX ORDER — VITAMIN B COMPLEX
1 CAPSULE ORAL DAILY
COMMUNITY

## 2020-09-28 ASSESSMENT — ENCOUNTER SYMPTOMS
ALLERGIC/IMMUNOLOGIC NEGATIVE: 1
EYES NEGATIVE: 1
GASTROINTESTINAL NEGATIVE: 1
RESPIRATORY NEGATIVE: 1

## 2020-09-28 NOTE — PATIENT INSTRUCTIONS
Continue present treatment  Low-sodium low-fat diet  Regular exercises  Follow with a gynecologist  Lab work before the next visit  Return to clinic earlier if any problems

## 2020-09-28 NOTE — PROGRESS NOTES
OFFICE PROGRESS NOTE      SUBJECTIVE:        Patient ID:   Giovani Ramirez is a 60 y.o. female who presents for   Chief Complaint   Patient presents with    Hyperlipidemia     Here for recheck on Hyperlipidemia. Lab work done,here for review. HPI:   Patient states is feeling good  Lab work is within normal limits        Prior to Visit Medications    Medication Sig Taking?  Authorizing Provider   influenza quadrivalent split vaccine (AFLURIA QUADRIVALENT) 0.5 ML injection Inject 0.5 mLs into the muscle once for 1 dose Yes Sathya Gonzalez MD   b complex vitamins capsule Take 1 capsule by mouth daily Yes Historical Provider, MD   simvastatin (ZOCOR) 5 MG tablet Take 1 tablet by mouth nightly Yes Sathya Gonzalez MD   Multiple Vitamins-Iron (MULTIVITAMIN/IRON PO) Take 1 tablet by mouth daily Yes Historical Provider, MD   aspirin 81 MG tablet Take 81 mg by mouth daily Yes Historical Provider, MD   vitamin C (ASCORBIC ACID) 500 MG tablet Take 500 mg by mouth daily Yes Historical Provider, MD   vitamin D (CHOLECALCIFEROL) 1000 UNIT TABS tablet Take 1,000 Units by mouth daily Yes Historical Provider, MD      Social History     Socioeconomic History    Marital status:      Spouse name: Not on file    Number of children: Not on file    Years of education: Not on file    Highest education level: Not on file   Occupational History    Not on file   Social Needs    Financial resource strain: Not on file    Food insecurity     Worry: Not on file     Inability: Not on file    Transportation needs     Medical: Not on file     Non-medical: Not on file   Tobacco Use    Smoking status: Never Smoker    Smokeless tobacco: Never Used   Substance and Sexual Activity    Alcohol use: No    Drug use: No    Sexual activity: Yes     Partners: Male   Lifestyle    Physical activity     Days per week: Not on file     Minutes per session: Not on file    Stress: Not on file Pulmonary:      Effort: Pulmonary effort is normal.      Breath sounds: Normal breath sounds. Abdominal:      General: Bowel sounds are normal.      Palpations: Abdomen is soft. Musculoskeletal: Normal range of motion. Skin:     General: Skin is warm and dry. Neurological:      Mental Status: She is alert and oriented to person, place, and time. Psychiatric:         Thought Content: Thought content normal.            Labs :    Lab Results   Component Value Date    WBC 4.9 09/22/2020    HGB 12.2 09/22/2020    HCT 37.1 09/22/2020     09/22/2020    CHOL 175 09/22/2020    TRIG 63 09/22/2020    HDL 67 09/22/2020    ALT 19 09/22/2020    AST 22 09/22/2020     09/22/2020    K 4.3 09/22/2020     09/22/2020    CREATININE 0.83 09/22/2020    BUN 9 09/22/2020    CO2 32 09/22/2020    GLUF 104 06/25/2018     No results found for: VOL, APPEARANCE, COLORU, LABSPEC, LABPH, LEUKBLD, NITRU, GLUCOSEU, KETUA, UROBILINOGEN, KETUA, UROBILINOGEN, BILIRUBINUR, OCBU  No results found for: PSA, CEA, , [id number],       Controlled Substances Monitoring:                                    ASSESSMENT     Patient Active Problem List    Diagnosis Date Noted    Iron deficiency anemia secondary to inadequate dietary iron intake 12/20/2018    Hyperlipidemia 04/10/2018        Diagnosis:     ICD-10-CM    1. Hyperlipidemia, unspecified hyperlipidemia type  E78.5 COMPREHENSIVE METABOLIC PANEL     CBC WITH AUTO DIFFERENTIAL     LIPID PANEL   2.  Iron deficiency anemia, unspecified iron deficiency anemia type  D50.9 CBC WITH AUTO DIFFERENTIAL       PLAN:   Continue present treatment  Low-sodium low-fat diet  Regular exercises  Follow-up with the gynecologist  Return to clinic earlier if any problem        Patient Instructions   Continue present treatment  Low-sodium low-fat diet  Regular exercises  Follow with a gynecologist  Lab work before the next visit  Return to clinic earlier if any problems      Return in about 6 months (around 3/28/2021). Nayana La reviewed my findings and recommendations with Ilana Brody.     Electronicallysigned by Rosio Serrano MD on 9/28/20 at 4:02 PM EDT

## 2020-11-12 LAB
CONTROL: NORMAL
HEMOCCULT STL QL: NORMAL

## 2020-11-12 PROCEDURE — 82274 ASSAY TEST FOR BLOOD FECAL: CPT | Performed by: FAMILY MEDICINE

## 2021-01-19 DIAGNOSIS — E78.5 HYPERLIPIDEMIA, UNSPECIFIED HYPERLIPIDEMIA TYPE: ICD-10-CM

## 2021-01-19 RX ORDER — SIMVASTATIN 5 MG
5 TABLET ORAL NIGHTLY
Qty: 90 TABLET | Refills: 1 | Status: SHIPPED
Start: 2021-01-19 | End: 2021-07-15 | Stop reason: SDUPTHER

## 2021-04-20 ENCOUNTER — TELEPHONE (OUTPATIENT)
Dept: FAMILY MEDICINE CLINIC | Age: 61
End: 2021-04-20

## 2021-04-20 DIAGNOSIS — E78.5 HYPERLIPIDEMIA, UNSPECIFIED HYPERLIPIDEMIA TYPE: ICD-10-CM

## 2021-04-20 DIAGNOSIS — D50.9 IRON DEFICIENCY ANEMIA, UNSPECIFIED IRON DEFICIENCY ANEMIA TYPE: Primary | ICD-10-CM

## 2021-04-28 LAB
ALBUMIN SERPL-MCNC: 4.3 G/DL (ref 3.5–5.2)
ALP BLD-CCNC: 56 U/L (ref 35–104)
ALT SERPL-CCNC: 20 U/L (ref 0–32)
ANION GAP SERPL CALCULATED.3IONS-SCNC: 12 MMOL/L (ref 7–16)
AST SERPL-CCNC: 28 U/L (ref 0–31)
BASOPHILS ABSOLUTE: 0.05 E9/L (ref 0–0.2)
BASOPHILS RELATIVE PERCENT: 1.4 % (ref 0–2)
BILIRUB SERPL-MCNC: 0.4 MG/DL (ref 0–1.2)
BUN BLDV-MCNC: 9 MG/DL (ref 6–23)
CALCIUM SERPL-MCNC: 9.4 MG/DL (ref 8.6–10.2)
CHLORIDE BLD-SCNC: 101 MMOL/L (ref 98–107)
CHOLESTEROL, TOTAL: 175 MG/DL (ref 0–199)
CO2: 26 MMOL/L (ref 22–29)
CREAT SERPL-MCNC: 0.8 MG/DL (ref 0.5–1)
EOSINOPHILS ABSOLUTE: 0.13 E9/L (ref 0.05–0.5)
EOSINOPHILS RELATIVE PERCENT: 3.6 % (ref 0–6)
GFR AFRICAN AMERICAN: >60
GFR NON-AFRICAN AMERICAN: >60 ML/MIN/1.73
GLUCOSE BLD-MCNC: 99 MG/DL (ref 74–99)
HCT VFR BLD CALC: 38.6 % (ref 34–48)
HDLC SERPL-MCNC: 69 MG/DL
HEMOGLOBIN: 11.8 G/DL (ref 11.5–15.5)
IMMATURE GRANULOCYTES #: 0.01 E9/L
IMMATURE GRANULOCYTES %: 0.3 % (ref 0–5)
LDL CHOLESTEROL CALCULATED: 95 MG/DL (ref 0–99)
LYMPHOCYTES ABSOLUTE: 1.61 E9/L (ref 1.5–4)
LYMPHOCYTES RELATIVE PERCENT: 44.1 % (ref 20–42)
MCH RBC QN AUTO: 29.7 PG (ref 26–35)
MCHC RBC AUTO-ENTMCNC: 30.6 % (ref 32–34.5)
MCV RBC AUTO: 97.2 FL (ref 80–99.9)
MONOCYTES ABSOLUTE: 0.43 E9/L (ref 0.1–0.95)
MONOCYTES RELATIVE PERCENT: 11.8 % (ref 2–12)
NEUTROPHILS ABSOLUTE: 1.42 E9/L (ref 1.8–7.3)
NEUTROPHILS RELATIVE PERCENT: 38.8 % (ref 43–80)
PDW BLD-RTO: 13.2 FL (ref 11.5–15)
PLATELET # BLD: 252 E9/L (ref 130–450)
PMV BLD AUTO: 10.7 FL (ref 7–12)
POTASSIUM SERPL-SCNC: 4.1 MMOL/L (ref 3.5–5)
RBC # BLD: 3.97 E12/L (ref 3.5–5.5)
SODIUM BLD-SCNC: 139 MMOL/L (ref 132–146)
TOTAL PROTEIN: 7 G/DL (ref 6.4–8.3)
TRIGL SERPL-MCNC: 57 MG/DL (ref 0–149)
VLDLC SERPL CALC-MCNC: 11 MG/DL
WBC # BLD: 3.7 E9/L (ref 4.5–11.5)

## 2021-05-03 ENCOUNTER — OFFICE VISIT (OUTPATIENT)
Dept: FAMILY MEDICINE CLINIC | Age: 61
End: 2021-05-03

## 2021-05-03 VITALS
DIASTOLIC BLOOD PRESSURE: 80 MMHG | BODY MASS INDEX: 20.83 KG/M2 | TEMPERATURE: 98.7 F | HEIGHT: 65 IN | WEIGHT: 125 LBS | OXYGEN SATURATION: 98 % | SYSTOLIC BLOOD PRESSURE: 116 MMHG | HEART RATE: 77 BPM

## 2021-05-03 DIAGNOSIS — D50.8 IRON DEFICIENCY ANEMIA SECONDARY TO INADEQUATE DIETARY IRON INTAKE: ICD-10-CM

## 2021-05-03 DIAGNOSIS — E78.5 HYPERLIPIDEMIA, UNSPECIFIED HYPERLIPIDEMIA TYPE: Primary | ICD-10-CM

## 2021-05-03 PROBLEM — E78.00 ELEVATED CHOLESTEROL: Status: ACTIVE | Noted: 2018-04-10

## 2021-05-03 PROCEDURE — 99214 OFFICE O/P EST MOD 30 MIN: CPT | Performed by: FAMILY MEDICINE

## 2021-05-03 SDOH — ECONOMIC STABILITY: INCOME INSECURITY: HOW HARD IS IT FOR YOU TO PAY FOR THE VERY BASICS LIKE FOOD, HOUSING, MEDICAL CARE, AND HEATING?: NOT HARD AT ALL

## 2021-05-03 SDOH — ECONOMIC STABILITY: FOOD INSECURITY: WITHIN THE PAST 12 MONTHS, THE FOOD YOU BOUGHT JUST DIDN'T LAST AND YOU DIDN'T HAVE MONEY TO GET MORE.: NEVER TRUE

## 2021-05-03 SDOH — ECONOMIC STABILITY: TRANSPORTATION INSECURITY
IN THE PAST 12 MONTHS, HAS THE LACK OF TRANSPORTATION KEPT YOU FROM MEDICAL APPOINTMENTS OR FROM GETTING MEDICATIONS?: NO

## 2021-05-03 SDOH — ECONOMIC STABILITY: FOOD INSECURITY: WITHIN THE PAST 12 MONTHS, YOU WORRIED THAT YOUR FOOD WOULD RUN OUT BEFORE YOU GOT MONEY TO BUY MORE.: NEVER TRUE

## 2021-05-03 SDOH — ECONOMIC STABILITY: TRANSPORTATION INSECURITY
IN THE PAST 12 MONTHS, HAS LACK OF TRANSPORTATION KEPT YOU FROM MEETINGS, WORK, OR FROM GETTING THINGS NEEDED FOR DAILY LIVING?: NO

## 2021-05-03 ASSESSMENT — PATIENT HEALTH QUESTIONNAIRE - PHQ9
SUM OF ALL RESPONSES TO PHQ QUESTIONS 1-9: 0
SUM OF ALL RESPONSES TO PHQ QUESTIONS 1-9: 0
2. FEELING DOWN, DEPRESSED OR HOPELESS: 0
SUM OF ALL RESPONSES TO PHQ9 QUESTIONS 1 & 2: 0
1. LITTLE INTEREST OR PLEASURE IN DOING THINGS: 0
SUM OF ALL RESPONSES TO PHQ QUESTIONS 1-9: 0

## 2021-05-03 NOTE — PATIENT INSTRUCTIONS
Continue present treatment  Low-fat diet  Regular exercises  Lab work before the next visit  Follow-up with a gynecologist  Return to clinic earlier if any problems

## 2021-05-03 NOTE — PROGRESS NOTES
This was an audio and video enabled telemedicine encounter.  Length of video 17 minutes.    Subjective   Dania Holliday is a 17 y.o. female.     Chief Complaint   Patient presents with   • Rash        Patient presents for video visit during global pandemic.  She started off with postnasal drainage for a week.  After that went away she had swelling of her lip.  Later then hives.  Had some new clothes so thought she needed to wash them but it did not help.  Keeps breaking out but they never go all away.  Topicals and benadryl have not helped much.  Has been very pleased with how much the lexapro has helped her anxiety.  No bad side effects but is concerned it mght be causing the rash.          The following portions of the patient's history were reviewed and updated as appropriate: allergies, current medications, past family history, past medical history, past social history, past surgical history and problem list.    Past Medical History:   Diagnosis Date   • Abscess     HIstory of    • Anxiety    • Consumes a vegan diet     On Vegan or strict vegeterian diet--Abstracted from [employer]   • History of attention deficit disorder    • History of cardiac arrhythmia    • History of ear infections    • Iron deficiency anemia    • Underweight     History of        History reviewed. No pertinent surgical history.    Family History   Problem Relation Age of Onset   • ADD / ADHD Mother    • Migraines Mother    • ADD / ADHD Father    • Anxiety disorder Sister    • Migraines Sister    • No Known Problems Other        Social History     Socioeconomic History   • Marital status: Single     Spouse name: Not on file   • Number of children: Not on file   • Years of education: Not on file   • Highest education level: Not on file   Tobacco Use   • Smoking status: Never Smoker   Substance and Sexual Activity   • Alcohol use: Defer   • Drug use: Defer         Current Outpatient Medications:   •  escitalopram (LEXAPRO) 10 MG tablet, TAKE  1 TABLET BY MOUTH EVERY DAY, Disp: 30 tablet, Rfl: 1    Review of Systems   Constitutional: Negative for chills, fatigue and fever.   HENT: Negative for congestion, rhinorrhea and sore throat.    Respiratory: Negative for cough and shortness of breath.    Cardiovascular: Negative for chest pain and leg swelling.   Gastrointestinal: Negative for abdominal pain.   Endocrine: Negative for polydipsia and polyuria.   Genitourinary: Negative for dysuria.   Musculoskeletal: Negative for arthralgias and myalgias.   Skin: Positive for color change and rash.   Neurological: Negative for dizziness.   Hematological: Does not bruise/bleed easily.   Psychiatric/Behavioral: Negative for sleep disturbance.       Objective   There were no vitals filed for this visit.  There is no height or weight on file to calculate BMI.  Physical Exam   Constitutional: She is oriented to person, place, and time. She appears well-developed and well-nourished. No distress.   HENT:   Head: Normocephalic and atraumatic.   Eyes: Pupils are equal, round, and reactive to light. Conjunctivae are normal.   Pulmonary/Chest: Effort normal. No respiratory distress.   Neurological: She is alert and oriented to person, place, and time.   Skin:   Urticaria noticed around neck on video visit   Psychiatric: She has a normal mood and affect.   Nursing note and vitals reviewed.        Assessment/Plan   Dania was seen today for rash.    Diagnoses and all orders for this visit:    Urticaria    Generalized anxiety disorder               Patient Instructions   Take 1/2 lexapro for 5-7 few days then stop it.  Take zyrtec twice daily until follow up.  Pepcid otc 20 mg twice daily for 5 days.  Take 25-50 mg benadryl at night as needed for sleep or itch.  Call the office next week with update on symptoms.      None     Active member of club or organization: None     Attends meetings of clubs or organizations: None     Relationship status: None    Intimate partner violence     Fear of current or ex partner: None     Emotionally abused: None     Physically abused: None     Forced sexual activity: None   Other Topics Concern    None   Social History Narrative    None       I have reviewed Yocasta's allergies, medications, problem list, medical, social and family history and have updated as needed in the electronic medical record  Review Of Systems:    Review of Systems   Constitutional: Negative. HENT: Negative. Eyes: Negative. Respiratory: Negative. Cardiovascular: Negative. Gastrointestinal: Negative. Endocrine: Negative. Musculoskeletal: Negative. Skin: Negative. Allergic/Immunologic: Negative. Neurological: Negative. Hematological: Negative. Psychiatric/Behavioral: Negative. OBJECTIVE:     VS:  Wt Readings from Last 3 Encounters:   05/03/21 125 lb (56.7 kg)   09/28/20 130 lb (59 kg)   05/06/20 134 lb (60.8 kg)     Temp Readings from Last 3 Encounters:   05/03/21 98.7 °F (37.1 °C) (Oral)   09/28/20 97 °F (36.1 °C) (Oral)   05/06/20 98.4 °F (36.9 °C) (Oral)     BP Readings from Last 3 Encounters:   05/03/21 116/80   09/28/20 123/79   05/06/20 110/68        Physical Exam  Constitutional:       Appearance: She is well-developed. HENT:      Head: Normocephalic and atraumatic. Eyes:      Conjunctiva/sclera: Conjunctivae normal.      Pupils: Pupils are equal, round, and reactive to light. Neck:      Musculoskeletal: Normal range of motion and neck supple. Cardiovascular:      Rate and Rhythm: Normal rate and regular rhythm. Heart sounds: Normal heart sounds. Pulmonary:      Effort: Pulmonary effort is normal.      Breath sounds: Normal breath sounds. Abdominal:      General: Bowel sounds are normal.      Palpations: Abdomen is soft.    Musculoskeletal: Normal range of motion. Skin:     General: Skin is warm and dry. Neurological:      Mental Status: She is alert and oriented to person, place, and time. Psychiatric:         Thought Content: Thought content normal.            Labs :    Lab Results   Component Value Date    WBC 3.7 (L) 04/28/2021    HGB 11.8 04/28/2021    HCT 38.6 04/28/2021     04/28/2021    CHOL 175 04/28/2021    TRIG 57 04/28/2021    HDL 69 04/28/2021    ALT 20 04/28/2021    AST 28 04/28/2021     04/28/2021    K 4.1 04/28/2021     04/28/2021    CREATININE 0.8 04/28/2021    BUN 9 04/28/2021    CO2 26 04/28/2021    GLUF 104 06/25/2018     No results found for: VOL, APPEARANCE, COLORU, LABSPEC, LABPH, LEUKBLD, NITRU, GLUCOSEU, KETUA, UROBILINOGEN, KETUA, UROBILINOGEN, BILIRUBINUR, OCBU  No results found for: PSA, CEA, , [id number],       Controlled Substances Monitoring:                                    ASSESSMENT     Patient Active Problem List    Diagnosis Date Noted    Iron deficiency anemia secondary to inadequate dietary iron intake 12/20/2018    Elevated cholesterol 04/10/2018        Diagnosis:     ICD-10-CM    1. Hyperlipidemia, unspecified hyperlipidemia type  E78.5 CBC WITH AUTO DIFFERENTIAL     COMPREHENSIVE METABOLIC PANEL     LIPID PANEL   2. Iron deficiency anemia secondary to inadequate dietary iron intake  D50.8 CBC WITH AUTO DIFFERENTIAL       PLAN:   Continue present treatment  Low-sodium low-fat diet  Regular exercises  Follow with a gynecologist  Return to clinic earlier if any problems        Patient Instructions   Continue present treatment  Low-fat diet  Regular exercises  Lab work before the next visit  Follow-up with a gynecologist  Return to clinic earlier if any problems      Return in about 6 months (around 11/3/2021). Mau Casey reviewed my findings and recommendations with Jacques Jolly.     Electronicallysigned by Brandie Walter MD on 5/3/21 at 2:46 PM EDT

## 2021-07-15 DIAGNOSIS — E78.5 HYPERLIPIDEMIA, UNSPECIFIED HYPERLIPIDEMIA TYPE: ICD-10-CM

## 2021-07-15 RX ORDER — SIMVASTATIN 5 MG
5 TABLET ORAL NIGHTLY
Qty: 90 TABLET | Refills: 1 | Status: SHIPPED
Start: 2021-07-15 | End: 2022-01-20

## 2021-11-05 LAB
ALBUMIN SERPL-MCNC: NORMAL G/DL
ALP BLD-CCNC: NORMAL U/L
ALT SERPL-CCNC: NORMAL U/L
ANION GAP SERPL CALCULATED.3IONS-SCNC: NORMAL MMOL/L
AST SERPL-CCNC: NORMAL U/L
BASOPHILS ABSOLUTE: NORMAL
BASOPHILS RELATIVE PERCENT: NORMAL
BILIRUB SERPL-MCNC: NORMAL MG/DL
BUN BLDV-MCNC: NORMAL MG/DL
CALCIUM SERPL-MCNC: NORMAL MG/DL
CHLORIDE BLD-SCNC: NORMAL MMOL/L
CHOLESTEROL, TOTAL: 175 MG/DL
CHOLESTEROL/HDL RATIO: 3
CO2: NORMAL
CREAT SERPL-MCNC: NORMAL MG/DL
EOSINOPHILS ABSOLUTE: NORMAL
EOSINOPHILS RELATIVE PERCENT: NORMAL
GFR CALCULATED: NORMAL
GLUCOSE BLD-MCNC: NORMAL MG/DL
HCT VFR BLD CALC: NORMAL %
HDLC SERPL-MCNC: 70 MG/DL (ref 35–70)
HEMOGLOBIN: NORMAL
LDL CHOLESTEROL CALCULATED: 94 MG/DL (ref 0–160)
LYMPHOCYTES ABSOLUTE: NORMAL
LYMPHOCYTES RELATIVE PERCENT: NORMAL
MCH RBC QN AUTO: NORMAL PG
MCHC RBC AUTO-ENTMCNC: NORMAL G/DL
MCV RBC AUTO: NORMAL FL
MONOCYTES ABSOLUTE: NORMAL
MONOCYTES RELATIVE PERCENT: NORMAL
NEUTROPHILS ABSOLUTE: NORMAL
NEUTROPHILS RELATIVE PERCENT: NORMAL
NONHDLC SERPL-MCNC: NORMAL MG/DL
PDW BLD-RTO: NORMAL %
PLATELET # BLD: NORMAL 10*3/UL
PMV BLD AUTO: NORMAL FL
POTASSIUM SERPL-SCNC: NORMAL MMOL/L
RBC # BLD: NORMAL 10*6/UL
SODIUM BLD-SCNC: NORMAL MMOL/L
TOTAL PROTEIN: NORMAL
TRIGL SERPL-MCNC: 53 MG/DL
VLDLC SERPL CALC-MCNC: NORMAL MG/DL
WBC # BLD: NORMAL 10*3/UL

## 2021-11-09 DIAGNOSIS — E78.5 HYPERLIPIDEMIA, UNSPECIFIED HYPERLIPIDEMIA TYPE: ICD-10-CM

## 2021-11-09 DIAGNOSIS — D50.8 IRON DEFICIENCY ANEMIA SECONDARY TO INADEQUATE DIETARY IRON INTAKE: ICD-10-CM

## 2021-11-15 ENCOUNTER — OFFICE VISIT (OUTPATIENT)
Dept: FAMILY MEDICINE CLINIC | Age: 61
End: 2021-11-15

## 2021-11-15 VITALS
BODY MASS INDEX: 20.45 KG/M2 | DIASTOLIC BLOOD PRESSURE: 78 MMHG | OXYGEN SATURATION: 96 % | TEMPERATURE: 98.2 F | WEIGHT: 121 LBS | HEART RATE: 72 BPM | SYSTOLIC BLOOD PRESSURE: 109 MMHG

## 2021-11-15 DIAGNOSIS — E78.5 HYPERLIPIDEMIA, UNSPECIFIED HYPERLIPIDEMIA TYPE: Primary | ICD-10-CM

## 2021-11-15 DIAGNOSIS — L81.9 ABNORMAL PIGMENTATION OF SKIN: ICD-10-CM

## 2021-11-15 DIAGNOSIS — D50.8 IRON DEFICIENCY ANEMIA SECONDARY TO INADEQUATE DIETARY IRON INTAKE: ICD-10-CM

## 2021-11-15 PROCEDURE — 99214 OFFICE O/P EST MOD 30 MIN: CPT | Performed by: FAMILY MEDICINE

## 2021-11-15 ASSESSMENT — ENCOUNTER SYMPTOMS
EYES NEGATIVE: 1
ALLERGIC/IMMUNOLOGIC NEGATIVE: 1
GASTROINTESTINAL NEGATIVE: 1
RESPIRATORY NEGATIVE: 1

## 2021-11-15 NOTE — PATIENT INSTRUCTIONS
Continue present treatment  Low-sodium low-fat diet  Continue iron supplement  Regular exercises  Follow with a dermatologist  Return to clinic earlier if any problem  Lab work before the next visit

## 2021-11-15 NOTE — PROGRESS NOTES
OFFICE PROGRESS NOTE      SUBJECTIVE:        Patient ID:   Blu Jacinto is a 63 y.o. female who presents for   Chief Complaint   Patient presents with    Hyperlipidemia     Here for recheck on Hyperlipidemia. Patient reports feeling well. Lab work done,here for review. HPI:   Patient states is feeling better  Lipids are normal  BMP is normal  Hemoglobin is 11.6  Patient been following the diet and exercise  Not been taking her iron supplement        Prior to Visit Medications    Medication Sig Taking?  Authorizing Provider   simvastatin (ZOCOR) 5 MG tablet Take 1 tablet by mouth nightly  Betsy Castaneda MD   b complex vitamins capsule Take 1 capsule by mouth daily  Historical Provider, MD   Multiple Vitamins-Iron (MULTIVITAMIN/IRON PO) Take 1 tablet by mouth daily  Historical Provider, MD   aspirin 81 MG tablet Take 81 mg by mouth daily  Historical Provider, MD   vitamin C (ASCORBIC ACID) 500 MG tablet Take 500 mg by mouth daily  Historical Provider, MD   vitamin D (CHOLECALCIFEROL) 1000 UNIT TABS tablet Take 1,000 Units by mouth daily  Historical Provider, MD      Social History     Socioeconomic History    Marital status:      Spouse name: None    Number of children: None    Years of education: None    Highest education level: None   Occupational History    None   Tobacco Use    Smoking status: Never Smoker    Smokeless tobacco: Never Used   Vaping Use    Vaping Use: Never used   Substance and Sexual Activity    Alcohol use: No    Drug use: No    Sexual activity: Yes     Partners: Male   Other Topics Concern    None   Social History Narrative    None     Social Determinants of Health     Financial Resource Strain: Low Risk     Difficulty of Paying Living Expenses: Not hard at all   Food Insecurity: No Food Insecurity    Worried About Running Out of Food in the Last Year: Never true    Jo of Food in the Last Year: Never true Transportation Needs: No Transportation Needs    Lack of Transportation (Medical): No    Lack of Transportation (Non-Medical): No   Physical Activity:     Days of Exercise per Week: Not on file    Minutes of Exercise per Session: Not on file   Stress:     Feeling of Stress : Not on file   Social Connections:     Frequency of Communication with Friends and Family: Not on file    Frequency of Social Gatherings with Friends and Family: Not on file    Attends Congregation Services: Not on file    Active Member of [address] [place] [address] or Organizations: Not on file    Attends Club or Organization Meetings: Not on file    Marital Status: Not on file   Intimate Partner Violence:     Fear of Current or Ex-Partner: Not on file    Emotionally Abused: Not on file    Physically Abused: Not on file    Sexually Abused: Not on file   Housing Stability:     Unable to Pay for Housing in the Last Year: Not on file    Number of [place] in the Last Year: Not on file    Unstable Housing in the Last Year: Not on file       I have reviewed Yocasta's allergies, medications, problem list, medical, social and family history and have updated as needed in the electronic medical record  Review Of Systems:    Review of Systems   Constitutional: Negative. HENT: Negative. Eyes: Negative. Respiratory: Negative. Cardiovascular: Negative. Gastrointestinal: Negative. Endocrine: Negative. Musculoskeletal: Negative. Skin: Negative. Allergic/Immunologic: Negative. Neurological: Negative. Hematological: Negative. Psychiatric/Behavioral: Negative.                OBJECTIVE:     VS:  Wt Readings from Last 3 Encounters:   11/15/21 121 lb (54.9 kg)   05/03/21 125 lb (56.7 kg)   09/28/20 130 lb (59 kg)     Temp Readings from Last 3 Encounters:   11/15/21 98.2 °F (36.8 °C) (Infrared)   05/03/21 98.7 °F (37.1 °C) (Oral)   09/28/20 97 °F (36.1 °C) (Oral)     BP Readings from Last 3 Encounters:   11/15/21 109/78   05/03/21 116/80   09/28/20 123/79        Physical Exam  Constitutional:       Appearance: She is well-developed. HENT:      Head: Normocephalic and atraumatic. Eyes:      Conjunctiva/sclera: Conjunctivae normal.      Pupils: Pupils are equal, round, and reactive to light. Cardiovascular:      Rate and Rhythm: Normal rate and regular rhythm. Heart sounds: Normal heart sounds. Pulmonary:      Effort: Pulmonary effort is normal.      Breath sounds: Normal breath sounds. Abdominal:      General: Bowel sounds are normal.      Palpations: Abdomen is soft. Musculoskeletal:         General: Normal range of motion. Cervical back: Normal range of motion and neck supple. Skin:     General: Skin is warm and dry. Neurological:      Mental Status: She is alert and oriented to person, place, and time. Psychiatric:         Thought Content: Thought content normal.            Labs :    Lab Results   Component Value Date    WBC 3.7 (L) 04/28/2021    HGB 11.8 04/28/2021    HCT 38.6 04/28/2021     04/28/2021    CHOL 175 11/05/2021    TRIG 53 11/05/2021    HDL 70 11/05/2021    ALT 20 04/28/2021    AST 28 04/28/2021     04/28/2021    K 4.1 04/28/2021     04/28/2021    CREATININE 0.8 04/28/2021    BUN 9 04/28/2021    CO2 26 04/28/2021    GLUF 104 06/25/2018     No results found for: VOL, APPEARANCE, COLORU, LABSPEC, LABPH, LEUKBLD, NITRU, GLUCOSEU, KETUA, UROBILINOGEN, KETUA, UROBILINOGEN, BILIRUBINUR, OCBU  No results found for: PSA, CEA, , [id number],       Controlled Substances Monitoring:                                    ASSESSMENT     Patient Active Problem List    Diagnosis Date Noted    Abnormal pigmentation of skin 11/15/2021    Iron deficiency anemia secondary to inadequate dietary iron intake 12/20/2018    Elevated cholesterol 04/10/2018        Diagnosis:     ICD-10-CM    1.  Hyperlipidemia, unspecified hyperlipidemia type  E78.5 CBC WITH AUTO DIFFERENTIAL     COMPREHENSIVE METABOLIC PANEL     LIPID PANEL   2. Iron deficiency anemia secondary to inadequate dietary iron intake  D50.8 CBC WITH AUTO DIFFERENTIAL   3. Abnormal pigmentation of skin  L81.9        PLAN:   Continue present treatment  Low-sodium low-fat diet  Continue iron supplement  Regular exercises  Follow with the consultants  Return to clinic earlier if any problems        Patient Instructions   Continue present treatment  Low-sodium low-fat diet  Continue iron supplement  Regular exercises  Follow with a dermatologist  Return to clinic earlier if any problem  Lab work before the next visit      Return in about 6 months (around 5/15/2022). Ivett Tran reviewed my findings and recommendations with iRta Dobbins.     Electronicallysigned by Mitch Stubbs MD on 11/15/21 at 3:04 PM EST

## 2022-01-20 DIAGNOSIS — E78.5 HYPERLIPIDEMIA, UNSPECIFIED HYPERLIPIDEMIA TYPE: ICD-10-CM

## 2022-01-20 RX ORDER — SIMVASTATIN 5 MG
TABLET ORAL
Qty: 90 TABLET | Refills: 1 | Status: SHIPPED
Start: 2022-01-20 | End: 2022-09-30 | Stop reason: SDUPTHER

## 2022-01-24 ENCOUNTER — VIRTUAL VISIT (OUTPATIENT)
Dept: FAMILY MEDICINE CLINIC | Age: 62
End: 2022-01-24

## 2022-01-24 DIAGNOSIS — J06.9 VIRAL URI: Primary | ICD-10-CM

## 2022-01-24 PROCEDURE — 99422 OL DIG E/M SVC 11-20 MIN: CPT | Performed by: FAMILY MEDICINE

## 2022-01-24 RX ORDER — AZITHROMYCIN 250 MG/1
250 TABLET, FILM COATED ORAL SEE ADMIN INSTRUCTIONS
Qty: 6 TABLET | Refills: 0 | Status: SHIPPED | OUTPATIENT
Start: 2022-01-24 | End: 2022-01-29

## 2022-01-24 RX ORDER — METHYLPREDNISOLONE 4 MG/1
TABLET ORAL
Qty: 1 KIT | Refills: 0 | Status: SHIPPED | OUTPATIENT
Start: 2022-01-24 | End: 2022-01-30

## 2022-01-24 RX ORDER — AZITHROMYCIN 250 MG/1
250 TABLET, FILM COATED ORAL SEE ADMIN INSTRUCTIONS
Qty: 6 TABLET | Refills: 0 | Status: SHIPPED
Start: 2022-01-24 | End: 2022-01-24

## 2022-01-24 ASSESSMENT — ENCOUNTER SYMPTOMS: COUGH: 1

## 2022-01-24 NOTE — PROGRESS NOTES
TeleMedicine Video Visit    Phong Huertas, was evaluated through a synchronous (real-time) audio-video encounter. The patient (or guardian if applicable) is aware that this is a billable service. Verbal consent to proceed has been obtained within the past 12 months. The visit was conducted pursuant to the emergency declaration under the [facility], [address] [place] [address] authority and the [employer] and [employer] General Act. Patient identification was verified, and a caregiver was present when appropriate. The patient was located in a state where the provider was credentialed to provide care. Patient identification was verified at the start of the visit, including the patient's telephone number and physical location. I discussed with the patient the nature of our telehealth visits, that:     1. Due to the nature of an audio- video modality, the only components of a physical exam that could be done are the elements supported by direct observation. 2. I would evaluate the patient and recommend diagnostics and treatments based on my assessment. 3. If it was felt that the patient should be evaluated in clinic or an emergency room setting, then they would be directed there. 4. Our sessions are not being recorded and that personal health information is protected. 5. Our team would provide follow up care in person if/when the patient needs it. Patient's location: home address in [facility]  Physician  location other address in [facility] other people involved in call nobody else involved in the call      On this date 1/24/2022 I have spent 20                                              OFFICE PROGRESS NOTE      SUBJECTIVE:        Patient ID:   Phong Huertas is a 57 y.o. female who presents for   Chief Complaint   Patient presents with    Cough     C/o slightly productive cough with sinus congestion x 3 days.            HPI:   Patient states he is getting cough and congestion for the last 2 to 3 days  Had a fever is getting better  Complaining of cough and congestion  Rest of the family members are sick        Prior to Visit Medications    Medication Sig Taking? Authorizing Provider   simvastatin (ZOCOR) 5 MG tablet TAKE ONE TABLET BY MOUTH NIGHTLY Yes Peyman Lees MD   b complex vitamins capsule Take 1 capsule by mouth daily Yes Historical Provider, MD   Multiple Vitamins-Iron (MULTIVITAMIN/IRON PO) Take 1 tablet by mouth daily Yes Historical Provider, MD   aspirin 81 MG tablet Take 81 mg by mouth daily Yes Historical Provider, MD   vitamin C (ASCORBIC ACID) 500 MG tablet Take 500 mg by mouth daily Yes Historical Provider, MD   vitamin D (CHOLECALCIFEROL) 1000 UNIT TABS tablet Take 1,000 Units by mouth daily Yes Historical Provider, MD      Social History     Socioeconomic History    Marital status:      Spouse name: None    Number of children: None    Years of education: None    Highest education level: None   Occupational History    None   Tobacco Use    Smoking status: Never Smoker    Smokeless tobacco: Never Used   Vaping Use    Vaping Use: Never used   Substance and Sexual Activity    Alcohol use: No    Drug use: No    Sexual activity: Yes     Partners: Male   Other Topics Concern    None   Social History Narrative    None     Social Determinants of Health     Financial Resource Strain: Low Risk     Difficulty of Paying Living Expenses: Not hard at all   Food Insecurity: No Food Insecurity    Worried About Running Out of Food in the Last Year: Never true    Jo of Food in the Last Year: Never true   Transportation Needs: No Transportation Needs    Lack of Transportation (Medical): No    Lack of Transportation (Non-Medical):  No   Physical Activity:     Days of Exercise per Week: Not on file    Minutes of Exercise per Session: Not on file   Stress:     Feeling of Stress : Not on file   Social Connections:     Frequency of Communication with Friends and Family: Not on file    Frequency of Social Gatherings with Friends and Family: Not on file    Attends Jew Services: Not on file    Active Member of Clubs or Organizations: Not on file    Attends Club or Organization Meetings: Not on file    Marital Status: Not on file   Intimate Partner Violence:     Fear of Current or Ex-Partner: Not on file    Emotionally Abused: Not on file    Physically Abused: Not on file    Sexually Abused: Not on file   Housing Stability:     Unable to Pay for Housing in the Last Year: Not on file    Number of [place] in the Last Year: Not on file    Unstable Housing in the Last Year: Not on file       I have reviewed Yocasta's allergies, medications, problem list, medical, social and family history and have updated as needed in the electronic medical record  Review Of Systems:    Review of Systems   HENT: Positive for congestion. Respiratory: Positive for cough. OBJECTIVE:     VS:  Wt Readings from Last 3 Encounters:   11/15/21 121 lb (54.9 kg)   05/03/21 125 lb (56.7 kg)   09/28/20 130 lb (59 kg)     Temp Readings from Last 3 Encounters:   11/15/21 98.2 °F (36.8 °C) (Infrared)   05/03/21 98.7 °F (37.1 °C) (Oral)   09/28/20 97 °F (36.1 °C) (Oral)     BP Readings from Last 3 Encounters:   11/15/21 109/78   05/03/21 116/80   09/28/20 123/79        Physical Exam  Constitutional:       Appearance: Normal appearance. Neurological:      Mental Status: She is alert.             Labs :    Lab Results   Component Value Date    WBC 3.7 (L) 04/28/2021    HGB 11.8 04/28/2021    HCT 38.6 04/28/2021     04/28/2021    CHOL 175 11/05/2021    TRIG 53 11/05/2021    HDL 70 11/05/2021    ALT 20 04/28/2021    AST 28 04/28/2021     04/28/2021    K 4.1 04/28/2021     04/28/2021    CREATININE 0.8 04/28/2021    BUN 9 04/28/2021    CO2 26 04/28/2021    GLUF 104 06/25/2018     No results found for: VOL, APPEARANCE, COLORU, Micki Devineo, LEUKBLD, Zachary Sahale, UROBILINOGEN, KETUA, UROBILINOGEN, BILIRUBINUR, OCBU  No results found for: PSA, CEA, , [id number],       Controlled Substances Monitoring:                                    ASSESSMENT     Patient Active Problem List    Diagnosis Date Noted    Abnormal pigmentation of skin 11/15/2021    Iron deficiency anemia secondary to inadequate dietary iron intake 12/20/2018    Elevated cholesterol 04/10/2018        Diagnosis:     ICD-10-CM    1. Viral URI  J06.9        PLAN:   Z-Vito  Medrol Dosepak  4 mg with food  Hold the statin  Force fluids  Go to ER if any worse  Stay home  Get COVID-19 test  Return to clinic earlier if any problems        Patient Instructions   Z-Vito  Medrol Dosepak  4 mg with food  Hold the statins  Force fluids  Off work  Stay home  Get the COVID-19 test done  Return to clinic earlier if any problems      Return in about 1 week (around 1/31/2022) for test results, Medication Check. Laury Chaves reviewed my findings and recommendations with Audrey Beck. Electronicallysigned by Mita Pitts MD on 1/24/22 at 9:53 AM EST     minutes reviewing previous notes, test results and face to face (virtual) with the patient discussing the diagnosis and importance of compliance with the treatment plan as well as documenting on the day of the visit. ,     This visit was completed virtually using [email or web address] me

## 2022-01-24 NOTE — PATIENT INSTRUCTIONS
Z-Vito  Medrol Dosepak  4 mg with food  Hold the statins  Force fluids  Off work  Stay home  Get the COVID-19 test done  Return to clinic earlier if any problems

## 2022-02-10 ENCOUNTER — OFFICE VISIT (OUTPATIENT)
Dept: SURGERY | Age: 62
End: 2022-02-10

## 2022-02-10 ENCOUNTER — TELEPHONE (OUTPATIENT)
Dept: SURGERY | Age: 62
End: 2022-02-10

## 2022-02-10 VITALS
WEIGHT: 123 LBS | DIASTOLIC BLOOD PRESSURE: 82 MMHG | SYSTOLIC BLOOD PRESSURE: 132 MMHG | BODY MASS INDEX: 20.49 KG/M2 | TEMPERATURE: 98 F | HEIGHT: 65 IN | HEART RATE: 67 BPM

## 2022-02-10 DIAGNOSIS — K64.2 GRADE III HEMORRHOIDS: Primary | ICD-10-CM

## 2022-02-10 PROCEDURE — 99204 OFFICE O/P NEW MOD 45 MIN: CPT | Performed by: SURGERY

## 2022-02-10 RX ORDER — HYDROCORTISONE ACETATE 25 MG/1
25 SUPPOSITORY RECTAL EVERY 12 HOURS
Qty: 30 SUPPOSITORY | Refills: 1 | Status: SHIPPED
Start: 2022-02-10 | End: 2022-06-01

## 2022-02-10 RX ORDER — LIDOCAINE 50 MG/G
OINTMENT TOPICAL
Qty: 30 G | Refills: 1 | Status: SHIPPED
Start: 2022-02-10 | End: 2022-06-01

## 2022-02-10 NOTE — TELEPHONE ENCOUNTER
Patient called in stating that the Rx that Dr. Junior Henson called in was going to be $90 out of pocket. She asked if there was an alternative Rx that could be called in. I advised pt I would discuss with Darcie ALONSO.     Darcie ALONSO notified and new script sent in. Pt was notified and she gave verbal understanding.

## 2022-02-10 NOTE — PROGRESS NOTES
General Surgery History and Physical    Patient's Name/Date of Birth: Mariana Crystal / 1960    Date: February 10, 2022     Surgeon: Marquita Patel M.D.    PCP: Chalino Nathan MD     Chief Complaint: hemorrhoids    HPI:   Mariana Crystal is a 57 y.o. female who presents for evaluation of hemorrhoid that are painful. They have been present for years, is  having daily bms, does  take stool softener and does take fiber. The patient has  had medicines to treat the hemorrhoids. The hemorrhoids cause severe pain, some bleeding. Last colonoscopy was normal.      Past Medical History:   Diagnosis Date    Anemia     Hyperlipidemia        No past surgical history on file. Current Outpatient Medications   Medication Sig Dispense Refill    hydrocortisone (ANUSOL-HC) 25 MG suppository Place 1 suppository rectally every 12 hours 30 suppository 1    simvastatin (ZOCOR) 5 MG tablet TAKE ONE TABLET BY MOUTH NIGHTLY 90 tablet 1    b complex vitamins capsule Take 1 capsule by mouth daily      Multiple Vitamins-Iron (MULTIVITAMIN/IRON PO) Take 1 tablet by mouth daily      aspirin 81 MG tablet Take 81 mg by mouth daily      vitamin C (ASCORBIC ACID) 500 MG tablet Take 500 mg by mouth daily      vitamin D (CHOLECALCIFEROL) 1000 UNIT TABS tablet Take 1,000 Units by mouth daily       No current facility-administered medications for this visit. No Known Allergies    The patient has a family history that is negative for severe cardiovascular or respiratory issues, negative for reaction to anesthesia. Social History     Socioeconomic History    Marital status:      Spouse name: Not on file    Number of children: Not on file    Years of education: Not on file    Highest education level: Not on file   Occupational History    Not on file   Tobacco Use    Smoking status: Never Smoker    Smokeless tobacco: Never Used   Vaping Use    Vaping Use: Never used   Substance and Sexual Activity    Alcohol use:  No  Drug use: No    Sexual activity: Yes     Partners: Male   Other Topics Concern    Not on file   Social History Narrative    Not on file     Social Determinants of Health     Financial Resource Strain: Low Risk     Difficulty of Paying Living Expenses: Not hard at all   Food Insecurity: No Food Insecurity    Worried About Running Out of Food in the Last Year: Never true    [address] in the Last Year: Never true   Transportation Needs: No Transportation Needs    Lack of Transportation (Medical): No    Lack of Transportation (Non-Medical):  No   Physical Activity:     Days of Exercise per Week: Not on file    Minutes of Exercise per Session: Not on file   Stress:     Feeling of Stress : Not on file   Social Connections:     Frequency of Communication with Friends and Family: Not on file    Frequency of Social Gatherings with Friends and Family: Not on file    Attends Scientologist Services: Not on file    Active Member of [address] [place] [address] or Organizations: Not on file    Attends Club or Organization Meetings: Not on file    Marital Status: Not on file   Intimate Partner Violence:     Fear of Current or Ex-Partner: Not on file    Emotionally Abused: Not on file    Physically Abused: Not on file    Sexually Abused: Not on file   Housing Stability:     Unable to Pay for Housing in the Last Year: Not on file    Number of [place] in the Last Year: Not on file    Unstable Housing in the Last Year: Not on file           Review of Systems  Review of Systems -  General ROS: negative for - chills, fatigue or malaise  ENT ROS: negative for - hearing change, nasal congestion or nasal discharge  Allergy and Immunology ROS: negative for - hives, itchy/watery eyes or nasal congestion  Hematological and Lymphatic ROS: negative for - blood clots, blood transfusions, bruising or fatigue  Endocrine ROS: negative for - malaise/lethargy, mood swings, palpitations or polydipsia/polyuria  Breast ROS: negative for - new or changing breast lumps or nipple changes  Respiratory ROS: negative for - sputum changes, stridor, tachypnea or wheezing  Cardiovascular ROS: negative for - irregular heartbeat, loss of consciousness, murmur or orthopnea  Gastrointestinal ROS: negative for - constipation, diarrhea, gas/bloating, heartburn or hematemesis, positive for hemorroids with intermittent bleeding and pain  Genito-Urinary ROS: negative for -  genital discharge, genital ulcers or hematuria  Musculoskeletal ROS: negative for - gait disturbance, muscle pain or muscular weakness    Physical exam:   /82 (Site: Right Upper Arm, Position: Sitting, Cuff Size: Medium Adult)   Pulse 67   Temp 98 °F (36.7 °C)   Ht 5' 4.5\" (1.638 m)   Wt 123 lb (55.8 kg)   BMI 20.79 kg/m²   General appearance:  NAD  Pyscho/social: negative for tremors and hallucinations  Head: NCAT, PERRLA, EOMI, red conjunctiva  Neck: supple, no masses  Lungs: CTAB, equal chest rise bilateral  Heart: Reg rate  Abdomen: soft, nontender, nondistended  Rectal: internal hemorrhoids, moderately inflamed, non thrombosed. Skin; no lesions  Gu: no cva tenderness  Extremities: extremities normal, atraumatic, no cyanosis or edema      Assessment:  57 y.o. female with hemorrhoids    Plan:  High fiber diet, stool softener, prn laxative for daily bm and anusol suppositories and if this does not work will do hemorrhoidectomy  Discussed the risk, benefits and alternatives of surgery including wound infections, bleeding, scar and hernia formation and the risks of general anesthetic including MI, CVA, sudden death or reactions to anesthetic medications. The patient understands the risks and alternatives and the possibility of converting to an open procedure. All questions were answered to the patient's satisfaction and they freely signed the consent.     Charleen Darby MD  11:45 AM  2/10/2022

## 2022-05-17 ENCOUNTER — TELEPHONE (OUTPATIENT)
Dept: FAMILY MEDICINE CLINIC | Age: 62
End: 2022-05-17

## 2022-05-17 NOTE — TELEPHONE ENCOUNTER
----- Message from Jeimy Serra sent at 9/33/7613  2:00 PM EDT -----  Subject: Message to Provider    QUESTIONS  Information for Provider? Mother requesting letter at the time of   appointment stating Son Rhys French ( [id number]) is unable to travel   independently will need a parent to travel with him due to special needs. ---------------------------------------------------------------------------  --------------  Myla Rushing INFO  What is the best way for the office to contact you? OK to leave message on   voicemail  Preferred Call Back Phone Number? [phone]  ---------------------------------------------------------------------------  --------------  SCRIPT ANSWERS  Relationship to Patient?  Self

## 2022-05-17 NOTE — TELEPHONE ENCOUNTER
Attempted to call patient back and there was no answer or answering machine so no message could be left

## 2022-05-23 DIAGNOSIS — J06.9 VIRAL URI: Primary | ICD-10-CM

## 2022-05-23 RX ORDER — AZITHROMYCIN 250 MG/1
250 TABLET, FILM COATED ORAL SEE ADMIN INSTRUCTIONS
Qty: 6 TABLET | Refills: 0 | Status: SHIPPED | OUTPATIENT
Start: 2022-05-23 | End: 2022-05-28

## 2022-05-23 NOTE — TELEPHONE ENCOUNTER
Patient informed of medication being sent to [facility]. States she will call and schedule an appointment for next week.

## 2022-05-23 NOTE — TELEPHONE ENCOUNTER
Patient called c/o productive cough with chest congestion. Does not want to do a video call. Requesting a zpack .

## 2022-06-01 ENCOUNTER — OFFICE VISIT (OUTPATIENT)
Dept: FAMILY MEDICINE CLINIC | Age: 62
End: 2022-06-01

## 2022-06-01 VITALS
BODY MASS INDEX: 20.79 KG/M2 | WEIGHT: 123 LBS | DIASTOLIC BLOOD PRESSURE: 68 MMHG | HEART RATE: 67 BPM | TEMPERATURE: 97.5 F | OXYGEN SATURATION: 97 % | SYSTOLIC BLOOD PRESSURE: 116 MMHG

## 2022-06-01 DIAGNOSIS — J06.9 VIRAL URI: Primary | ICD-10-CM

## 2022-06-01 DIAGNOSIS — D50.8 IRON DEFICIENCY ANEMIA SECONDARY TO INADEQUATE DIETARY IRON INTAKE: ICD-10-CM

## 2022-06-01 DIAGNOSIS — E78.5 HYPERLIPIDEMIA, UNSPECIFIED HYPERLIPIDEMIA TYPE: ICD-10-CM

## 2022-06-01 DIAGNOSIS — Z12.11 COLON CANCER SCREENING: ICD-10-CM

## 2022-06-01 PROCEDURE — 99214 OFFICE O/P EST MOD 30 MIN: CPT | Performed by: FAMILY MEDICINE

## 2022-06-01 SDOH — ECONOMIC STABILITY: FOOD INSECURITY: WITHIN THE PAST 12 MONTHS, YOU WORRIED THAT YOUR FOOD WOULD RUN OUT BEFORE YOU GOT MONEY TO BUY MORE.: NEVER TRUE

## 2022-06-01 SDOH — ECONOMIC STABILITY: FOOD INSECURITY: WITHIN THE PAST 12 MONTHS, THE FOOD YOU BOUGHT JUST DIDN'T LAST AND YOU DIDN'T HAVE MONEY TO GET MORE.: NEVER TRUE

## 2022-06-01 ASSESSMENT — PATIENT HEALTH QUESTIONNAIRE - PHQ9
SUM OF ALL RESPONSES TO PHQ QUESTIONS 1-9: 0
2. FEELING DOWN, DEPRESSED OR HOPELESS: 0
SUM OF ALL RESPONSES TO PHQ QUESTIONS 1-9: 0
SUM OF ALL RESPONSES TO PHQ9 QUESTIONS 1 & 2: 0
SUM OF ALL RESPONSES TO PHQ QUESTIONS 1-9: 0
1. LITTLE INTEREST OR PLEASURE IN DOING THINGS: 0
SUM OF ALL RESPONSES TO PHQ QUESTIONS 1-9: 0

## 2022-06-01 ASSESSMENT — ENCOUNTER SYMPTOMS
COUGH: 1
EYES NEGATIVE: 1
GASTROINTESTINAL NEGATIVE: 1
ALLERGIC/IMMUNOLOGIC NEGATIVE: 1

## 2022-06-01 ASSESSMENT — SOCIAL DETERMINANTS OF HEALTH (SDOH): HOW HARD IS IT FOR YOU TO PAY FOR THE VERY BASICS LIKE FOOD, HOUSING, MEDICAL CARE, AND HEATING?: NOT HARD AT ALL

## 2022-06-01 NOTE — PROGRESS NOTES
OFFICE PROGRESS NOTE      SUBJECTIVE:        Patient ID:   Patricia Guzman is a 57 y.o. female who presents for   Chief Complaint   Patient presents with    URI     Here for recheck on URI. Finnished z pack. Did miss 4 days of work due to illness. HPI:   Patient has URI symptoms  Patient did not check for the COVID-19  Patient took Z-Vito result  She states she is got some cough  But is improving  Has been working        Prior to Visit Medications    Medication Sig Taking?  Authorizing Provider   simvastatin (ZOCOR) 5 MG tablet TAKE ONE TABLET BY MOUTH NIGHTLY Yes Kay Lang MD   b complex vitamins capsule Take 1 capsule by mouth daily Yes Historical Provider, MD   Multiple Vitamins-Iron (MULTIVITAMIN/IRON PO) Take 1 tablet by mouth daily Yes Historical Provider, MD   aspirin 81 MG tablet Take 81 mg by mouth daily Yes Historical Provider, MD   vitamin C (ASCORBIC ACID) 500 MG tablet Take 500 mg by mouth daily Yes Historical Provider, MD   vitamin D (CHOLECALCIFEROL) 1000 UNIT TABS tablet Take 1,000 Units by mouth daily Yes Historical Provider, MD      Social History     Socioeconomic History    Marital status:      Spouse name: None    Number of children: None    Years of education: None    Highest education level: None   Occupational History    None   Tobacco Use    Smoking status: Never Smoker    Smokeless tobacco: Never Used   Vaping Use    Vaping Use: Never used   Substance and Sexual Activity    Alcohol use: No    Drug use: No    Sexual activity: Yes     Partners: Male   Other Topics Concern    None   Social History Narrative    None     Social Determinants of Health     Financial Resource Strain: Low Risk     Difficulty of Paying Living Expenses: Not hard at all   Food Insecurity: No Food Insecurity    Worried About Running Out of Food in the Last Year: Never true    Jo of Food in the Last Year: Never true   Transportation Needs:     Lack of Transportation (Medical): Not on file    Lack of Transportation (Non-Medical): Not on file   Physical Activity:     Days of Exercise per Week: Not on file    Minutes of Exercise per Session: Not on file   Stress:     Feeling of Stress : Not on file   Social Connections:     Frequency of Communication with Friends and Family: Not on file    Frequency of Social Gatherings with Friends and Family: Not on file    Attends Anabaptist Services: Not on file    Active Member of [address] [place] [address] or Organizations: Not on file    Attends Club or Organization Meetings: Not on file    Marital Status: Not on file   Intimate Partner Violence:     Fear of Current or Ex-Partner: Not on file    Emotionally Abused: Not on file    Physically Abused: Not on file    Sexually Abused: Not on file   Housing Stability:     Unable to Pay for Housing in the Last Year: Not on file    Number of [place] in the Last Year: Not on file    Unstable Housing in the Last Year: Not on file       I have reviewed Yocasta's allergies, medications, problem list, medical, social and family history and have updated as needed in the electronic medical record  Review Of Systems:    Review of Systems   Constitutional: Negative. HENT: Negative. Eyes: Negative. Respiratory: Positive for cough. Cardiovascular: Negative. Gastrointestinal: Negative. Endocrine: Negative. Musculoskeletal: Negative. Skin: Negative. Allergic/Immunologic: Negative. Neurological: Negative. Hematological: Negative. Psychiatric/Behavioral: Negative.                OBJECTIVE:     VS:  Wt Readings from Last 3 Encounters:   06/01/22 123 lb (55.8 kg)   02/10/22 123 lb (55.8 kg)   11/15/21 121 lb (54.9 kg)     Temp Readings from Last 3 Encounters:   06/01/22 97.5 °F (36.4 °C) (Infrared)   02/10/22 98 °F (36.7 °C)   11/15/21 98.2 °F (36.8 °C) (Infrared)     BP Readings from Last 3 Encounters:   06/01/22 116/68   02/10/22 132/82 11/15/21 109/78        Physical Exam  Constitutional:       Appearance: She is well-developed. HENT:      Head: Normocephalic and atraumatic. Eyes:      Conjunctiva/sclera: Conjunctivae normal.      Pupils: Pupils are equal, round, and reactive to light. Cardiovascular:      Rate and Rhythm: Normal rate and regular rhythm. Heart sounds: Normal heart sounds. Pulmonary:      Effort: Pulmonary effort is normal.      Breath sounds: Normal breath sounds. Abdominal:      General: Bowel sounds are normal.      Palpations: Abdomen is soft. Musculoskeletal:         General: Normal range of motion. Cervical back: Normal range of motion and neck supple. Skin:     General: Skin is warm and dry. Neurological:      Mental Status: She is alert and oriented to person, place, and time. Psychiatric:         Thought Content: Thought content normal.            Labs :    Lab Results   Component Value Date    WBC 3.7 (L) 04/28/2021    HGB 11.8 04/28/2021    HCT 38.6 04/28/2021     04/28/2021    CHOL 175 11/05/2021    TRIG 53 11/05/2021    HDL 70 11/05/2021    ALT 20 04/28/2021    AST 28 04/28/2021     04/28/2021    K 4.1 04/28/2021     04/28/2021    CREATININE 0.8 04/28/2021    BUN 9 04/28/2021    CO2 26 04/28/2021    GLUF 104 06/25/2018     No results found for: VOL, APPEARANCE, COLORU, LABSPEC, LABPH, LEUKBLD, NITRU, GLUCOSEU, KETUA, UROBILINOGEN, KETUA, UROBILINOGEN, BILIRUBINUR, OCBU  No results found for: PSA, CEA, , [id number],       Controlled Substances Monitoring:                                    ASSESSMENT     Patient Active Problem List    Diagnosis Date Noted    Abnormal pigmentation of skin 11/15/2021    Iron deficiency anemia secondary to inadequate dietary iron intake 12/20/2018    Elevated cholesterol 04/10/2018        Diagnosis:     ICD-10-CM    1. Viral URI  J06.9 CBC with Auto Differential   2.  Hyperlipidemia, unspecified hyperlipidemia type  E78.5 Comprehensive Metabolic Panel     LIPID PANEL   3. Iron deficiency anemia secondary to inadequate dietary iron intake  D50.8 CBC with Auto Differential   4. Colon cancer screening  Z12.11 POCT Occult Blood Stool       PLAN:   Continue present treatment  Force fluids  Take the cough medication  Lab work before the next visit  Return to clinic earlier if any problems        Patient Instructions   Continue present medication  Take the cough medicine   Continue low-fat diet  Force fluids  Return to clinic earlier if any problems      Return Regular appointment. Rina Parikh reviewed my findings and recommendations with Corrine Cortes.     Electronicallysigned by Concepcion Lantigua MD on 6/1/22 at 3:30 PM EDT

## 2022-06-01 NOTE — PATIENT INSTRUCTIONS
Continue present medication  Take the cough medicine   Continue low-fat diet  Force fluids  Return to clinic earlier if any problems

## 2022-06-17 DIAGNOSIS — E78.5 HYPERLIPIDEMIA, UNSPECIFIED HYPERLIPIDEMIA TYPE: ICD-10-CM

## 2022-06-17 DIAGNOSIS — D50.8 IRON DEFICIENCY ANEMIA SECONDARY TO INADEQUATE DIETARY IRON INTAKE: ICD-10-CM

## 2022-06-17 LAB
ALBUMIN SERPL-MCNC: 4.2 G/DL (ref 3.5–5.2)
ALP BLD-CCNC: 55 U/L (ref 35–104)
ALT SERPL-CCNC: 24 U/L (ref 0–32)
ANION GAP SERPL CALCULATED.3IONS-SCNC: 15 MMOL/L (ref 7–16)
AST SERPL-CCNC: 40 U/L (ref 0–31)
BASOPHILS ABSOLUTE: 0.05 E9/L (ref 0–0.2)
BASOPHILS RELATIVE PERCENT: 1.2 % (ref 0–2)
BILIRUB SERPL-MCNC: 0.3 MG/DL (ref 0–1.2)
BUN BLDV-MCNC: 9 MG/DL (ref 6–23)
CALCIUM SERPL-MCNC: 9.2 MG/DL (ref 8.6–10.2)
CHLORIDE BLD-SCNC: 103 MMOL/L (ref 98–107)
CHOLESTEROL, TOTAL: 180 MG/DL (ref 0–199)
CO2: 23 MMOL/L (ref 22–29)
CREAT SERPL-MCNC: 0.8 MG/DL (ref 0.5–1)
EOSINOPHILS ABSOLUTE: 0.21 E9/L (ref 0.05–0.5)
EOSINOPHILS RELATIVE PERCENT: 5.1 % (ref 0–6)
GFR AFRICAN AMERICAN: >60
GFR NON-AFRICAN AMERICAN: >60 ML/MIN/1.73
GLUCOSE BLD-MCNC: 105 MG/DL (ref 74–99)
HCT VFR BLD CALC: 36 % (ref 34–48)
HDLC SERPL-MCNC: 70 MG/DL
HEMOGLOBIN: 11.3 G/DL (ref 11.5–15.5)
IMMATURE GRANULOCYTES #: 0.01 E9/L
IMMATURE GRANULOCYTES %: 0.2 % (ref 0–5)
LDL CHOLESTEROL CALCULATED: 99 MG/DL (ref 0–99)
LYMPHOCYTES ABSOLUTE: 1.81 E9/L (ref 1.5–4)
LYMPHOCYTES RELATIVE PERCENT: 44 % (ref 20–42)
MCH RBC QN AUTO: 29.6 PG (ref 26–35)
MCHC RBC AUTO-ENTMCNC: 31.4 % (ref 32–34.5)
MCV RBC AUTO: 94.2 FL (ref 80–99.9)
MONOCYTES ABSOLUTE: 0.43 E9/L (ref 0.1–0.95)
MONOCYTES RELATIVE PERCENT: 10.5 % (ref 2–12)
NEUTROPHILS ABSOLUTE: 1.6 E9/L (ref 1.8–7.3)
NEUTROPHILS RELATIVE PERCENT: 39 % (ref 43–80)
PDW BLD-RTO: 14.2 FL (ref 11.5–15)
PLATELET # BLD: 249 E9/L (ref 130–450)
PMV BLD AUTO: 10.5 FL (ref 7–12)
POTASSIUM SERPL-SCNC: 4.8 MMOL/L (ref 3.5–5)
RBC # BLD: 3.82 E12/L (ref 3.5–5.5)
SODIUM BLD-SCNC: 141 MMOL/L (ref 132–146)
TOTAL PROTEIN: 6.9 G/DL (ref 6.4–8.3)
TRIGL SERPL-MCNC: 56 MG/DL (ref 0–149)
VLDLC SERPL CALC-MCNC: 11 MG/DL
WBC # BLD: 4.1 E9/L (ref 4.5–11.5)

## 2022-06-22 ENCOUNTER — OFFICE VISIT (OUTPATIENT)
Dept: FAMILY MEDICINE CLINIC | Age: 62
End: 2022-06-22

## 2022-06-22 VITALS
OXYGEN SATURATION: 99 % | HEART RATE: 74 BPM | TEMPERATURE: 97 F | DIASTOLIC BLOOD PRESSURE: 74 MMHG | SYSTOLIC BLOOD PRESSURE: 120 MMHG | WEIGHT: 125 LBS | BODY MASS INDEX: 21.12 KG/M2

## 2022-06-22 DIAGNOSIS — R73.9 HYPERGLYCEMIA: ICD-10-CM

## 2022-06-22 DIAGNOSIS — Z12.11 COLON CANCER SCREENING: ICD-10-CM

## 2022-06-22 DIAGNOSIS — D50.8 IRON DEFICIENCY ANEMIA SECONDARY TO INADEQUATE DIETARY IRON INTAKE: ICD-10-CM

## 2022-06-22 DIAGNOSIS — E78.5 HYPERLIPIDEMIA, UNSPECIFIED HYPERLIPIDEMIA TYPE: Primary | ICD-10-CM

## 2022-06-22 PROCEDURE — 99214 OFFICE O/P EST MOD 30 MIN: CPT | Performed by: FAMILY MEDICINE

## 2022-06-22 ASSESSMENT — PATIENT HEALTH QUESTIONNAIRE - PHQ9
SUM OF ALL RESPONSES TO PHQ QUESTIONS 1-9: 0
2. FEELING DOWN, DEPRESSED OR HOPELESS: 0
SUM OF ALL RESPONSES TO PHQ QUESTIONS 1-9: 0
SUM OF ALL RESPONSES TO PHQ QUESTIONS 1-9: 0
SUM OF ALL RESPONSES TO PHQ9 QUESTIONS 1 & 2: 0
SUM OF ALL RESPONSES TO PHQ QUESTIONS 1-9: 0
1. LITTLE INTEREST OR PLEASURE IN DOING THINGS: 0

## 2022-06-22 ASSESSMENT — ENCOUNTER SYMPTOMS
ALLERGIC/IMMUNOLOGIC NEGATIVE: 1
GASTROINTESTINAL NEGATIVE: 1
EYES NEGATIVE: 1
RESPIRATORY NEGATIVE: 1

## 2022-06-22 NOTE — PROGRESS NOTES
Transportation (Medical): Not on file    Lack of Transportation (Non-Medical): Not on file   Physical Activity:     Days of Exercise per Week: Not on file    Minutes of Exercise per Session: Not on file   Stress:     Feeling of Stress : Not on file   Social Connections:     Frequency of Communication with Friends and Family: Not on file    Frequency of Social Gatherings with Friends and Family: Not on file    Attends Pentecostalism Services: Not on file    Active Member of [address] [place] [address] or Organizations: Not on file    Attends Club or Organization Meetings: Not on file    Marital Status: Not on file   Intimate Partner Violence:     Fear of Current or Ex-Partner: Not on file    Emotionally Abused: Not on file    Physically Abused: Not on file    Sexually Abused: Not on file   Housing Stability:     Unable to Pay for Housing in the Last Year: Not on file    Number of [place] in the Last Year: Not on file    Unstable Housing in the Last Year: Not on file       I have reviewed Yocasta's allergies, medications, problem list, medical, social and family history and have updated as needed in the electronic medical record  Review Of Systems:    Review of Systems   Constitutional: Negative. HENT: Negative. Eyes: Negative. Respiratory: Negative. Cardiovascular: Negative. Gastrointestinal: Negative. Endocrine: Negative. Musculoskeletal: Negative. Skin: Negative. Allergic/Immunologic: Negative. Neurological: Negative. Hematological: Negative. Psychiatric/Behavioral: Negative.                OBJECTIVE:     VS:  Wt Readings from Last 3 Encounters:   06/22/22 125 lb (56.7 kg)   06/01/22 123 lb (55.8 kg)   02/10/22 123 lb (55.8 kg)     Temp Readings from Last 3 Encounters:   06/22/22 97 °F (36.1 °C) (Infrared)   06/01/22 97.5 °F (36.4 °C) (Infrared)   02/10/22 98 °F (36.7 °C)     BP Readings from Last 3 Encounters:   06/22/22 120/74   06/01/22 116/68   02/10/22 132/82        Physical Exam  Constitutional:       Appearance: She is well-developed. HENT:      Head: Normocephalic and atraumatic. Eyes:      Conjunctiva/sclera: Conjunctivae normal.      Pupils: Pupils are equal, round, and reactive to light. Cardiovascular:      Rate and Rhythm: Normal rate and regular rhythm. Heart sounds: Normal heart sounds. Pulmonary:      Effort: Pulmonary effort is normal.      Breath sounds: Normal breath sounds. Abdominal:      General: Bowel sounds are normal.      Palpations: Abdomen is soft. Musculoskeletal:         General: Normal range of motion. Cervical back: Normal range of motion and neck supple. Skin:     General: Skin is warm and dry. Neurological:      Mental Status: She is alert and oriented to person, place, and time. Psychiatric:         Thought Content: Thought content normal.            Labs :    Lab Results   Component Value Date    WBC 4.1 (L) 06/17/2022    HGB 11.3 (L) 06/17/2022    HCT 36.0 06/17/2022     06/17/2022    CHOL 180 06/17/2022    TRIG 56 06/17/2022    HDL 70 06/17/2022    ALT 24 06/17/2022    AST 40 (H) 06/17/2022     06/17/2022    K 4.8 06/17/2022     06/17/2022    CREATININE 0.8 06/17/2022    BUN 9 06/17/2022    CO2 23 06/17/2022    GLUF 104 06/25/2018     No results found for: VOL, APPEARANCE, COLORU, LABSPEC, LABPH, LEUKBLD, NITRU, GLUCOSEU, KETUA, UROBILINOGEN, KETUA, UROBILINOGEN, BILIRUBINUR, OCBU  No results found for: PSA, CEA, , [id number],       Controlled Substances Monitoring:                                    ASSESSMENT     Patient Active Problem List    Diagnosis Date Noted    Abnormal pigmentation of skin 11/15/2021    Iron deficiency anemia secondary to inadequate dietary iron intake 12/20/2018    Elevated cholesterol 04/10/2018        Diagnosis:     ICD-10-CM    1. Hyperlipidemia, unspecified hyperlipidemia type  E78.5 Comprehensive Metabolic Panel     LIPID PANEL   2.  Iron deficiency anemia secondary to inadequate dietary iron intake  D50.8 CBC with Auto Differential   3. Hyperglycemia  R73.9 Comprehensive Metabolic Panel     Hemoglobin A1C   4. Colon cancer screening  Z12.11 POCT Occult Blood Stool     CBC with Auto Differential       PLAN:   Continue present treatment  Low-sodium low-fat low-carb diet  Regular exercises  Follow-up with a gynecologist  Lab work before the next visit  Return to clinic earlier if any problems        Patient Instructions   Continue present treatment  Low-sodium low-fat low-carb diet  Regular exercises  Follow-up with a gynecologist  Lab work before the next visit  Return to clinic earlier if any problems      Return in about 6 months (around 12/22/2022) for Medication Check, test results. Chio Garland reviewed my findings and recommendations with Vaishali Wen.     Electronicallysigned by Rich Styles MD on 6/22/22 at 3:16 PM EDT

## 2022-06-22 NOTE — PATIENT INSTRUCTIONS
Continue present treatment  Low-sodium low-fat low-carb diet  Regular exercises  Follow-up with a gynecologist  Lab work before the next visit  Return to clinic earlier if any problems

## 2022-06-24 DIAGNOSIS — R73.9 HYPERGLYCEMIA: ICD-10-CM

## 2022-06-24 DIAGNOSIS — Z12.11 COLON CANCER SCREENING: Primary | ICD-10-CM

## 2022-06-24 LAB
CONTROL: NORMAL
HEMOCCULT STL QL: NEGATIVE

## 2022-09-30 DIAGNOSIS — E78.5 HYPERLIPIDEMIA, UNSPECIFIED HYPERLIPIDEMIA TYPE: ICD-10-CM

## 2022-09-30 RX ORDER — SIMVASTATIN 5 MG
TABLET ORAL
Qty: 90 TABLET | Refills: 1 | Status: SHIPPED | OUTPATIENT
Start: 2022-09-30

## 2022-09-30 RX ORDER — SIMVASTATIN 5 MG
TABLET ORAL
Qty: 90 TABLET | Refills: 1 | Status: SHIPPED
Start: 2022-09-30 | End: 2022-09-30 | Stop reason: SDUPTHER

## 2022-10-19 ENCOUNTER — TELEPHONE (OUTPATIENT)
Dept: FAMILY MEDICINE CLINIC | Age: 62
End: 2022-10-19

## 2022-10-19 NOTE — TELEPHONE ENCOUNTER
Patient states Simvastatin is unavailable in 5 mg. from [facility]. She gets a lower price from [facility]. They have a 90 day supply of 10 mg. And she would like to know if she can get a new Rx of 10 mg. 1/2 qd or 10 mg. Qod    It is too expensive to get from local pharmacy.

## 2022-12-02 LAB
ALBUMIN SERPL-MCNC: NORMAL G/DL
ALP BLD-CCNC: NORMAL U/L
ALT SERPL-CCNC: NORMAL U/L
ANION GAP SERPL CALCULATED.3IONS-SCNC: NORMAL MMOL/L
AST SERPL-CCNC: NORMAL U/L
AVERAGE GLUCOSE: 123
BASOPHILS ABSOLUTE: NORMAL
BASOPHILS RELATIVE PERCENT: NORMAL
BILIRUB SERPL-MCNC: NORMAL MG/DL
BUN BLDV-MCNC: NORMAL MG/DL
CALCIUM SERPL-MCNC: NORMAL MG/DL
CHLORIDE BLD-SCNC: NORMAL MMOL/L
CHOLESTEROL, TOTAL: 183 MG/DL
CHOLESTEROL/HDL RATIO: 2
CO2: NORMAL
CREAT SERPL-MCNC: NORMAL MG/DL
EOSINOPHILS ABSOLUTE: NORMAL
EOSINOPHILS RELATIVE PERCENT: NORMAL
GFR CALCULATED: NORMAL
GLUCOSE BLD-MCNC: NORMAL MG/DL
HBA1C MFR BLD: 5.9 %
HCT VFR BLD CALC: NORMAL %
HDLC SERPL-MCNC: 78 MG/DL (ref 35–70)
HEMOGLOBIN: NORMAL
LDL CHOLESTEROL CALCULATED: 92 MG/DL (ref 0–160)
LYMPHOCYTES ABSOLUTE: NORMAL
LYMPHOCYTES RELATIVE PERCENT: NORMAL
MCH RBC QN AUTO: NORMAL PG
MCHC RBC AUTO-ENTMCNC: NORMAL G/DL
MCV RBC AUTO: NORMAL FL
MONOCYTES ABSOLUTE: NORMAL
MONOCYTES RELATIVE PERCENT: NORMAL
NEUTROPHILS ABSOLUTE: NORMAL
NEUTROPHILS RELATIVE PERCENT: NORMAL
NONHDLC SERPL-MCNC: ABNORMAL MG/DL
PDW BLD-RTO: NORMAL %
PLATELET # BLD: NORMAL 10*3/UL
PMV BLD AUTO: NORMAL FL
POTASSIUM SERPL-SCNC: NORMAL MMOL/L
RBC # BLD: NORMAL 10*6/UL
SODIUM BLD-SCNC: NORMAL MMOL/L
TOTAL PROTEIN: NORMAL
TRIGL SERPL-MCNC: 67 MG/DL
VLDLC SERPL CALC-MCNC: ABNORMAL MG/DL
WBC # BLD: NORMAL 10*3/UL

## 2022-12-06 DIAGNOSIS — D50.8 IRON DEFICIENCY ANEMIA SECONDARY TO INADEQUATE DIETARY IRON INTAKE: ICD-10-CM

## 2022-12-06 DIAGNOSIS — E78.5 HYPERLIPIDEMIA, UNSPECIFIED HYPERLIPIDEMIA TYPE: ICD-10-CM

## 2022-12-06 DIAGNOSIS — Z12.11 COLON CANCER SCREENING: ICD-10-CM

## 2022-12-06 DIAGNOSIS — R73.9 HYPERGLYCEMIA: ICD-10-CM

## 2022-12-21 ENCOUNTER — OFFICE VISIT (OUTPATIENT)
Dept: FAMILY MEDICINE CLINIC | Age: 62
End: 2022-12-21

## 2022-12-21 VITALS
BODY MASS INDEX: 20.79 KG/M2 | OXYGEN SATURATION: 96 % | WEIGHT: 123 LBS | DIASTOLIC BLOOD PRESSURE: 66 MMHG | SYSTOLIC BLOOD PRESSURE: 118 MMHG | TEMPERATURE: 97.1 F | HEART RATE: 86 BPM

## 2022-12-21 DIAGNOSIS — D50.8 IRON DEFICIENCY ANEMIA SECONDARY TO INADEQUATE DIETARY IRON INTAKE: ICD-10-CM

## 2022-12-21 DIAGNOSIS — E78.5 HYPERLIPIDEMIA, UNSPECIFIED HYPERLIPIDEMIA TYPE: Primary | ICD-10-CM

## 2022-12-21 DIAGNOSIS — R73.9 HYPERGLYCEMIA: ICD-10-CM

## 2022-12-21 PROCEDURE — 99214 OFFICE O/P EST MOD 30 MIN: CPT | Performed by: FAMILY MEDICINE

## 2022-12-21 RX ORDER — SIMVASTATIN 5 MG
5 TABLET ORAL EVERY EVENING
Qty: 90 TABLET | Refills: 1 | Status: SHIPPED | OUTPATIENT
Start: 2022-12-21

## 2022-12-21 RX ORDER — SIMVASTATIN 5 MG
TABLET ORAL
Qty: 90 TABLET | Refills: 1 | Status: SHIPPED | OUTPATIENT
Start: 2022-12-21

## 2022-12-21 ASSESSMENT — ENCOUNTER SYMPTOMS
ALLERGIC/IMMUNOLOGIC NEGATIVE: 1
RESPIRATORY NEGATIVE: 1
GASTROINTESTINAL NEGATIVE: 1
EYES NEGATIVE: 1

## 2022-12-21 NOTE — PROGRESS NOTES
OFFICE PROGRESS NOTE      SUBJECTIVE:        Patient ID:   Dontrell Collins is a 60 y.o. female who presents for   Chief Complaint   Patient presents with    Hyperlipidemia     Here for recheck on Hyperlipidemia. Patient reports feeling well. Lab work done,here for review. HPI:   Patient states she is feeling better  Lab work is within normal limits  Does follow the diet and exercise  Has been taking medication as prescribed      Prior to Visit Medications    Medication Sig Taking?  Authorizing Provider   simvastatin (ZOCOR) 5 MG tablet TAKE ONE TABLET BY MOUTH NIGHTLY Yes Jimmie Ashley MD   b complex vitamins capsule Take 1 capsule by mouth daily Yes Historical Provider, MD   Multiple Vitamins-Iron (MULTIVITAMIN/IRON PO) Take 1 tablet by mouth daily Yes Historical Provider, MD   aspirin 81 MG tablet Take 81 mg by mouth daily Yes Historical Provider, MD   vitamin C (ASCORBIC ACID) 500 MG tablet Take 500 mg by mouth daily Yes Historical Provider, MD   vitamin D (CHOLECALCIFEROL) 1000 UNIT TABS tablet Take 1,000 Units by mouth daily Yes Historical Provider, MD      Social History     Socioeconomic History    Marital status:      Spouse name: None    Number of children: None    Years of education: None    Highest education level: None   Tobacco Use    Smoking status: Never    Smokeless tobacco: Never   Vaping Use    Vaping Use: Never used   Substance and Sexual Activity    Alcohol use: No    Drug use: No    Sexual activity: Yes     Partners: Male     Social Determinants of Health     Financial Resource Strain: Low Risk     Difficulty of Paying Living Expenses: Not hard at all   Food Insecurity: No Food Insecurity    Worried About Running Out of Food in the Last Year: Never true    Ran Out of Food in the Last Year: Never true       I have reviewed Yocasta's allergies, medications, problem list, medical, social and family history and have updated as needed in the electronic medical record  Review Of Systems:    Review of Systems   Constitutional: Negative. HENT: Negative. Eyes: Negative. Respiratory: Negative. Cardiovascular: Negative. Gastrointestinal: Negative. Endocrine: Negative. Musculoskeletal: Negative. Skin: Negative. Allergic/Immunologic: Negative. Neurological: Negative. Hematological: Negative. Psychiatric/Behavioral: Negative. OBJECTIVE:     VS:  Wt Readings from Last 3 Encounters:   12/21/22 123 lb (55.8 kg)   06/22/22 125 lb (56.7 kg)   06/01/22 123 lb (55.8 kg)     Temp Readings from Last 3 Encounters:   12/21/22 97.1 °F (36.2 °C) (Infrared)   06/22/22 97 °F (36.1 °C) (Infrared)   06/01/22 97.5 °F (36.4 °C) (Infrared)     BP Readings from Last 3 Encounters:   12/21/22 118/66   06/22/22 120/74   06/01/22 116/68        Physical Exam  Constitutional:       Appearance: She is well-developed. HENT:      Head: Normocephalic and atraumatic. Eyes:      Conjunctiva/sclera: Conjunctivae normal.      Pupils: Pupils are equal, round, and reactive to light. Cardiovascular:      Rate and Rhythm: Normal rate and regular rhythm. Heart sounds: Normal heart sounds. Pulmonary:      Effort: Pulmonary effort is normal.      Breath sounds: Normal breath sounds. Abdominal:      General: Bowel sounds are normal.      Palpations: Abdomen is soft. Musculoskeletal:         General: Normal range of motion. Cervical back: Normal range of motion and neck supple. Skin:     General: Skin is warm and dry. Neurological:      Mental Status: She is alert and oriented to person, place, and time. Psychiatric:         Thought Content:  Thought content normal.          Labs :    Lab Results   Component Value Date    WBC 4.1 (L) 06/17/2022    HGB 11.3 (L) 06/17/2022    HCT 36.0 06/17/2022     06/17/2022    CHOL 183 12/02/2022    TRIG 67 12/02/2022    HDL 78 (A) 12/02/2022    ALT 24 06/17/2022    AST 40 (H) 06/17/2022     06/17/2022    K 4.8 06/17/2022     06/17/2022    CREATININE 0.8 06/17/2022    BUN 9 06/17/2022    CO2 23 06/17/2022    GLUF 104 06/25/2018    LABA1C 5.9 12/02/2022     No results found for: VOL, APPEARANCE, COLORU, LABSPEC, LABPH, LEUKBLD, NITRU, GLUCOSEU, KETUA, UROBILINOGEN, KETUA, UROBILINOGEN, BILIRUBINUR, OCBU  No results found for: PSA, CEA, , CO5778,       Controlled Substances Monitoring:                                    ASSESSMENT     Patient Active Problem List    Diagnosis Date Noted    Abnormal pigmentation of skin 11/15/2021    Iron deficiency anemia secondary to inadequate dietary iron intake 12/20/2018    Elevated cholesterol 04/10/2018        Diagnosis:     ICD-10-CM    1. Hyperlipidemia, unspecified hyperlipidemia type  E78.5 CBC with Auto Differential     Comprehensive Metabolic Panel     LIPID PANEL     simvastatin (ZOCOR) 5 MG tablet      2. Hyperglycemia  R73.9 CBC with Auto Differential     Comprehensive Metabolic Panel      3. Iron deficiency anemia secondary to inadequate dietary iron intake  D50.8 CBC with Auto Differential          PLAN:   Continue present treatment  Low-sodium low-fat low-carb diet  Regular exercises  Lab work before the next visit  Return to clinic earlier if any problems  All instruction given to the patient        Patient Instructions   Continue present treatment  Low-sodium low-fat low-carb diet  Regular exercises  Lab work before the next visit  Return to clinic earlier if any problems    Return in about 6 months (around 6/21/2023) for Medication Check, test results. Alexei Juárez reviewed my findings and recommendations with Kyra Mccormick.     Electronicallysigned by Nadeen March MD on 12/21/22 at 2:45 PM EST

## 2023-02-03 ENCOUNTER — TELEPHONE (OUTPATIENT)
Dept: FAMILY MEDICINE CLINIC | Age: 63
End: 2023-02-03

## 2023-02-03 NOTE — TELEPHONE ENCOUNTER
I called patient since she is overdue for a Mammogram.  Patient's  informed she is not available and will have her call the ofc back.

## 2023-04-04 DIAGNOSIS — E78.5 HYPERLIPIDEMIA, UNSPECIFIED HYPERLIPIDEMIA TYPE: ICD-10-CM

## 2023-04-05 RX ORDER — SIMVASTATIN 5 MG
5 TABLET ORAL EVERY EVENING
Qty: 90 TABLET | Refills: 1 | Status: SHIPPED | OUTPATIENT
Start: 2023-04-05

## 2023-04-17 RX ORDER — SIMVASTATIN 10 MG
10 TABLET ORAL NIGHTLY
Qty: 45 TABLET | Refills: 1 | Status: SHIPPED | OUTPATIENT
Start: 2023-04-17 | End: 2023-07-16

## 2023-04-17 RX ORDER — SIMVASTATIN 10 MG
10 TABLET ORAL NIGHTLY
COMMUNITY
End: 2023-04-17 | Stop reason: SDUPTHER

## 2023-04-27 ENCOUNTER — HOSPITAL ENCOUNTER (EMERGENCY)
Age: 63
Discharge: HOME | End: 2023-04-27

## 2023-04-27 ENCOUNTER — APPOINTMENT (OUTPATIENT)
Dept: GENERAL RADIOLOGY | Age: 63
End: 2023-04-27

## 2023-04-27 VITALS
WEIGHT: 125 LBS | HEART RATE: 69 BPM | SYSTOLIC BLOOD PRESSURE: 127 MMHG | OXYGEN SATURATION: 100 % | RESPIRATION RATE: 20 BRPM | BODY MASS INDEX: 21.12 KG/M2 | DIASTOLIC BLOOD PRESSURE: 67 MMHG | TEMPERATURE: 98 F

## 2023-04-27 DIAGNOSIS — S22.42XA CLOSED FRACTURE OF MULTIPLE RIBS OF LEFT SIDE, INITIAL ENCOUNTER: Primary | ICD-10-CM

## 2023-04-27 PROCEDURE — 99211 OFF/OP EST MAY X REQ PHY/QHP: CPT

## 2023-04-27 PROCEDURE — 71101 X-RAY EXAM UNILAT RIBS/CHEST: CPT

## 2023-04-27 NOTE — ED PROVIDER NOTES
[facility]  EMERGENCY DEPARTMENT ENCOUNTER        NAME: Torey Wyatt  :  1960  MRN:  [id number]  Date of evaluation: 2023  Provider: Amy Olmos PA-C  PCP: Francesco Hewitt MD  Note Started : 3:31 PM EDT 23    Chief Complaint: Corinne Henry this am on last step of stairs hit left chest area by left breast )      This is a 62-year-old female that presents to urgent care complaining of left lateral rib cage pain. She states around 5:00 this morning she missed a step and fell. She denies head neck back arm leg abdomen hip pain. She does state some pain with moving and breathing. She denies any lightheadedness or dizziness. On first contact of patient she appears to be in no acute distress. She is here with her . Review of Systems  Pertinent positives and negatives are stated within HPI, all other systems reviewed and are negative. Allergies: Patient has no known allergies. --------------------------------------------- PAST HISTORY ---------------------------------------------  Past Medical History:  has a past medical history of Anemia and Hyperlipidemia. Past Surgical History:  has no past surgical history on file. Social History:  reports that she has never smoked. She has never used smokeless tobacco. She reports that she does not drink alcohol and does not use drugs. Family History: family history is not on file. The patients home medications have been reviewed. The nursing notes within the ED encounter have been reviewed.      ------------------------------------------------SCREENINGS----------------------------------------------                        CIWA Assessment  BP: 127/67  Heart Rate: 69           ---------------------------------------------PHYSICAL EXAM --------------------------------------------    Vitals:    23 1532   BP: 127/67   Pulse: 69   Resp: 20   Temp: 98 °F (36.7 °C)   SpO2: 100%   Weight: 125 lb

## 2023-04-27 NOTE — DISCHARGE INSTRUCTIONS
Take ibuprofen for pain and inflammation as directed. Tylenol for pain as directed. Use topical medication such as lidocaine patch over the site for pain. Use your incentive spirometer. Recommend follow-up with your primary care provider. If symptoms worsen go to the emergency department.

## 2023-05-02 ENCOUNTER — TELEPHONE (OUTPATIENT)
Dept: FAMILY MEDICINE CLINIC | Age: 63
End: 2023-05-02

## 2023-05-02 NOTE — TELEPHONE ENCOUNTER
Called pt about mammogram.  Pt stated she does not want to get them anymore. Please do not continue to call her.

## 2023-06-26 DIAGNOSIS — R73.9 HYPERGLYCEMIA: ICD-10-CM

## 2023-06-26 DIAGNOSIS — D50.8 IRON DEFICIENCY ANEMIA SECONDARY TO INADEQUATE DIETARY IRON INTAKE: ICD-10-CM

## 2023-06-26 DIAGNOSIS — E78.5 HYPERLIPIDEMIA, UNSPECIFIED HYPERLIPIDEMIA TYPE: ICD-10-CM

## 2023-06-26 LAB
ALBUMIN SERPL-MCNC: 4.4 G/DL (ref 3.5–5.2)
ALP SERPL-CCNC: 61 U/L (ref 35–104)
ALT SERPL-CCNC: 15 U/L (ref 0–32)
ANION GAP SERPL CALCULATED.3IONS-SCNC: 10 MMOL/L (ref 7–16)
AST SERPL-CCNC: 22 U/L (ref 0–31)
BASOPHILS # BLD: 0.05 E9/L (ref 0–0.2)
BASOPHILS NFR BLD: 1.4 % (ref 0–2)
BILIRUB SERPL-MCNC: 0.3 MG/DL (ref 0–1.2)
BUN SERPL-MCNC: 10 MG/DL (ref 6–23)
CALCIUM SERPL-MCNC: 9.4 MG/DL (ref 8.6–10.2)
CHLORIDE SERPL-SCNC: 104 MMOL/L (ref 98–107)
CHOLESTEROL, TOTAL: 188 MG/DL (ref 0–199)
CO2 SERPL-SCNC: 26 MMOL/L (ref 22–29)
CREAT SERPL-MCNC: 0.7 MG/DL (ref 0.5–1)
EOSINOPHIL # BLD: 0.12 E9/L (ref 0.05–0.5)
EOSINOPHIL NFR BLD: 3.3 % (ref 0–6)
ERYTHROCYTE [DISTWIDTH] IN BLOOD BY AUTOMATED COUNT: 13.2 FL (ref 11.5–15)
GLUCOSE SERPL-MCNC: 102 MG/DL (ref 74–99)
HCT VFR BLD AUTO: 41.4 % (ref 34–48)
HDLC SERPL-MCNC: 72 MG/DL
HGB BLD-MCNC: 12.6 G/DL (ref 11.5–15.5)
IMM GRANULOCYTES # BLD: 0 E9/L
IMM GRANULOCYTES NFR BLD: 0 % (ref 0–5)
LDLC SERPL CALC-MCNC: 104 MG/DL (ref 0–99)
LYMPHOCYTES # BLD: 1.33 E9/L (ref 1.5–4)
LYMPHOCYTES NFR BLD: 36.6 % (ref 20–42)
MCH RBC QN AUTO: 30.1 PG (ref 26–35)
MCHC RBC AUTO-ENTMCNC: 30.4 % (ref 32–34.5)
MCV RBC AUTO: 99 FL (ref 80–99.9)
MONOCYTES # BLD: 0.37 E9/L (ref 0.1–0.95)
MONOCYTES NFR BLD: 10.2 % (ref 2–12)
NEUTROPHILS # BLD: 1.76 E9/L (ref 1.8–7.3)
NEUTS SEG NFR BLD: 48.5 % (ref 43–80)
PLATELET # BLD AUTO: 291 E9/L (ref 130–450)
PMV BLD AUTO: 10.1 FL (ref 7–12)
POTASSIUM SERPL-SCNC: 4.5 MMOL/L (ref 3.5–5)
PROT SERPL-MCNC: 6.8 G/DL (ref 6.4–8.3)
RBC # BLD AUTO: 4.18 E12/L (ref 3.5–5.5)
SODIUM SERPL-SCNC: 140 MMOL/L (ref 132–146)
TRIGL SERPL-MCNC: 62 MG/DL (ref 0–149)
VLDLC SERPL CALC-MCNC: 12 MG/DL
WBC # BLD: 3.6 E9/L (ref 4.5–11.5)

## 2023-07-03 ENCOUNTER — OFFICE VISIT (OUTPATIENT)
Dept: FAMILY MEDICINE CLINIC | Age: 63
End: 2023-07-03

## 2023-07-03 VITALS
SYSTOLIC BLOOD PRESSURE: 136 MMHG | WEIGHT: 124 LBS | DIASTOLIC BLOOD PRESSURE: 76 MMHG | TEMPERATURE: 97 F | OXYGEN SATURATION: 100 % | HEIGHT: 65 IN | BODY MASS INDEX: 20.66 KG/M2 | HEART RATE: 81 BPM

## 2023-07-03 DIAGNOSIS — R73.9 HYPERGLYCEMIA: ICD-10-CM

## 2023-07-03 DIAGNOSIS — E78.5 HYPERLIPIDEMIA, UNSPECIFIED HYPERLIPIDEMIA TYPE: ICD-10-CM

## 2023-07-03 DIAGNOSIS — Z12.11 SCREEN FOR COLON CANCER: Primary | ICD-10-CM

## 2023-07-03 PROCEDURE — 99214 OFFICE O/P EST MOD 30 MIN: CPT | Performed by: FAMILY MEDICINE

## 2023-07-03 ASSESSMENT — ENCOUNTER SYMPTOMS
GASTROINTESTINAL NEGATIVE: 1
EYES NEGATIVE: 1
RESPIRATORY NEGATIVE: 1
ALLERGIC/IMMUNOLOGIC NEGATIVE: 1

## 2023-07-03 NOTE — PATIENT INSTRUCTIONS
Continue present treatment  Low-fat low-carb diet  Regular exercises  Lab work before the next visit  Return to clinic earlier if any problems

## 2023-08-01 DIAGNOSIS — Z12.11 SCREEN FOR COLON CANCER: ICD-10-CM

## 2023-08-01 LAB
CONTROL: NORMAL
HEMOCCULT STL QL: NEGATIVE

## 2023-10-02 DIAGNOSIS — R73.9 HYPERGLYCEMIA: ICD-10-CM

## 2023-10-02 DIAGNOSIS — E78.5 HYPERLIPIDEMIA, UNSPECIFIED HYPERLIPIDEMIA TYPE: ICD-10-CM

## 2023-10-02 DIAGNOSIS — Z12.11 SCREEN FOR COLON CANCER: ICD-10-CM

## 2023-10-02 LAB
ABSOLUTE IMMATURE GRANULOCYTE: <0.03 K/UL (ref 0–0.58)
ALBUMIN SERPL-MCNC: 4.4 G/DL (ref 3.5–5.2)
ALP BLD-CCNC: 64 U/L (ref 35–104)
ALT SERPL-CCNC: 15 U/L (ref 0–32)
ANION GAP SERPL CALCULATED.3IONS-SCNC: 12 MMOL/L (ref 7–16)
AST SERPL-CCNC: 23 U/L (ref 0–31)
BASOPHILS ABSOLUTE: 0.06 K/UL (ref 0–0.2)
BASOPHILS RELATIVE PERCENT: 2 % (ref 0–2)
BILIRUB SERPL-MCNC: 0.3 MG/DL (ref 0–1.2)
BUN BLDV-MCNC: 9 MG/DL (ref 6–23)
CALCIUM SERPL-MCNC: 9 MG/DL (ref 8.6–10.2)
CHLORIDE BLD-SCNC: 105 MMOL/L (ref 98–107)
CO2: 28 MMOL/L (ref 22–29)
CREAT SERPL-MCNC: 0.6 MG/DL (ref 0.5–1)
EOSINOPHILS ABSOLUTE: 0.12 K/UL (ref 0.05–0.5)
EOSINOPHILS RELATIVE PERCENT: 3 % (ref 0–6)
GFR SERPL CREATININE-BSD FRML MDRD: >60 ML/MIN/1.73M2
GLUCOSE BLD-MCNC: 102 MG/DL (ref 74–99)
HCT VFR BLD CALC: 38.6 % (ref 34–48)
HEMOGLOBIN: 12.2 G/DL (ref 11.5–15.5)
IMMATURE GRANULOCYTES: 1 % (ref 0–5)
LYMPHOCYTES ABSOLUTE: 1.6 K/UL (ref 1.5–4)
LYMPHOCYTES RELATIVE PERCENT: 40 % (ref 20–42)
MCH RBC QN AUTO: 30.4 PG (ref 26–35)
MCHC RBC AUTO-ENTMCNC: 31.6 G/DL (ref 32–34.5)
MCV RBC AUTO: 96.3 FL (ref 80–99.9)
MONOCYTES ABSOLUTE: 0.41 K/UL (ref 0.1–0.95)
MONOCYTES RELATIVE PERCENT: 10 % (ref 2–12)
NEUTROPHILS ABSOLUTE: 1.8 K/UL (ref 1.8–7.3)
NEUTROPHILS RELATIVE PERCENT: 45 % (ref 43–80)
PDW BLD-RTO: 12.9 % (ref 11.5–15)
PLATELET # BLD: 265 K/UL (ref 130–450)
PMV BLD AUTO: 10.3 FL (ref 7–12)
POTASSIUM SERPL-SCNC: 4.2 MMOL/L (ref 3.5–5)
RBC # BLD: 4.01 M/UL (ref 3.5–5.5)
SODIUM BLD-SCNC: 145 MMOL/L (ref 132–146)
TOTAL PROTEIN: 7 G/DL (ref 6.4–8.3)
WBC # BLD: 4 K/UL (ref 4.5–11.5)

## 2023-10-16 ENCOUNTER — OFFICE VISIT (OUTPATIENT)
Dept: FAMILY MEDICINE CLINIC | Age: 63
End: 2023-10-16

## 2023-10-16 VITALS
SYSTOLIC BLOOD PRESSURE: 112 MMHG | OXYGEN SATURATION: 98 % | HEART RATE: 68 BPM | BODY MASS INDEX: 20.3 KG/M2 | DIASTOLIC BLOOD PRESSURE: 70 MMHG | TEMPERATURE: 97.4 F | WEIGHT: 122 LBS

## 2023-10-16 DIAGNOSIS — R73.9 HYPERGLYCEMIA: ICD-10-CM

## 2023-10-16 DIAGNOSIS — E78.5 HYPERLIPIDEMIA, UNSPECIFIED HYPERLIPIDEMIA TYPE: Primary | ICD-10-CM

## 2023-10-16 PROCEDURE — 99214 OFFICE O/P EST MOD 30 MIN: CPT | Performed by: FAMILY MEDICINE

## 2023-10-16 RX ORDER — SIMVASTATIN 5 MG
5 TABLET ORAL NIGHTLY
Qty: 90 TABLET | Refills: 1 | Status: SHIPPED | OUTPATIENT
Start: 2023-10-16

## 2023-10-16 RX ORDER — SIMVASTATIN 5 MG
5 TABLET ORAL NIGHTLY
COMMUNITY
End: 2023-10-16 | Stop reason: SDUPTHER

## 2023-10-16 SDOH — ECONOMIC STABILITY: INCOME INSECURITY: HOW HARD IS IT FOR YOU TO PAY FOR THE VERY BASICS LIKE FOOD, HOUSING, MEDICAL CARE, AND HEATING?: NOT HARD AT ALL

## 2023-10-16 SDOH — ECONOMIC STABILITY: HOUSING INSECURITY
IN THE LAST 12 MONTHS, WAS THERE A TIME WHEN YOU DID NOT HAVE A STEADY PLACE TO SLEEP OR SLEPT IN A SHELTER (INCLUDING NOW)?: NO

## 2023-10-16 SDOH — ECONOMIC STABILITY: FOOD INSECURITY: WITHIN THE PAST 12 MONTHS, THE FOOD YOU BOUGHT JUST DIDN'T LAST AND YOU DIDN'T HAVE MONEY TO GET MORE.: NEVER TRUE

## 2023-10-16 SDOH — ECONOMIC STABILITY: FOOD INSECURITY: WITHIN THE PAST 12 MONTHS, YOU WORRIED THAT YOUR FOOD WOULD RUN OUT BEFORE YOU GOT MONEY TO BUY MORE.: NEVER TRUE

## 2023-10-16 ASSESSMENT — PATIENT HEALTH QUESTIONNAIRE - PHQ9
SUM OF ALL RESPONSES TO PHQ QUESTIONS 1-9: 0
1. LITTLE INTEREST OR PLEASURE IN DOING THINGS: 0
SUM OF ALL RESPONSES TO PHQ9 QUESTIONS 1 & 2: 0
SUM OF ALL RESPONSES TO PHQ QUESTIONS 1-9: 0
2. FEELING DOWN, DEPRESSED OR HOPELESS: 0
SUM OF ALL RESPONSES TO PHQ QUESTIONS 1-9: 0
SUM OF ALL RESPONSES TO PHQ QUESTIONS 1-9: 0

## 2023-10-16 ASSESSMENT — ENCOUNTER SYMPTOMS
ALLERGIC/IMMUNOLOGIC NEGATIVE: 1
GASTROINTESTINAL NEGATIVE: 1
RESPIRATORY NEGATIVE: 1
EYES NEGATIVE: 1

## 2023-10-16 NOTE — PATIENT INSTRUCTIONS
Continue present treatment  Low-sodium low-fat low-carb diet  Regular exercises  Lab work before the next visit  Return to clinic earlier if any problems
No

## 2023-10-16 NOTE — PROGRESS NOTES
OFFICE PROGRESS NOTE      SUBJECTIVE:        Patient ID:   Haja Carvalho is a 60 y.o. female who presents for   Chief Complaint   Patient presents with    Hyperlipidemia     Here for recheck on Hyperlipidemia. Lab work done,here for review. HPI:   Patient here for the follow-up  She states she is feeling good  She is following the diet and exercise  Had lab work done  Fasting sugar 102  Lipids were not done  Patient did not get the mammogram done        Prior to Visit Medications    Medication Sig Taking?  Authorizing Provider   simvastatin (ZOCOR) 5 MG tablet Take 1 tablet by mouth nightly Yes Epi Oconnor MD   b complex vitamins capsule Take 1 capsule by mouth daily Yes Christy Garcia MD   Multiple Vitamins-Iron (MULTIVITAMIN/IRON PO) Take 1 tablet by mouth daily Yes Christy Garcia MD   aspirin 81 MG tablet Take 1 tablet by mouth daily Yes Christy Garcia MD   vitamin C (ASCORBIC ACID) 500 MG tablet Take 1 tablet by mouth daily Yes Christy Garcia MD   vitamin D (CHOLECALCIFEROL) 1000 UNIT TABS tablet Take 1 tablet by mouth daily Yes Christy Garcia MD      Social History     Socioeconomic History    Marital status:      Spouse name: None    Number of children: None    Years of education: None    Highest education level: None   Tobacco Use    Smoking status: Never    Smokeless tobacco: Never   Vaping Use    Vaping Use: Never used   Substance and Sexual Activity    Alcohol use: No    Drug use: No    Sexual activity: Yes     Partners: Male     Social Determinants of Health     Financial Resource Strain: Low Risk  (10/16/2023)    Overall Financial Resource Strain (CARDIA)     Difficulty of Paying Living Expenses: Not hard at all   Food Insecurity: No Food Insecurity (10/16/2023)    Hunger Vital Sign     Worried About Running Out of Food in the Last Year: Never true     Ran Out of Food in the Last Year: Never true

## 2023-12-04 ENCOUNTER — OFFICE VISIT (OUTPATIENT)
Dept: FAMILY MEDICINE CLINIC | Age: 63
End: 2023-12-04
Payer: COMMERCIAL

## 2023-12-04 VITALS
HEART RATE: 76 BPM | DIASTOLIC BLOOD PRESSURE: 84 MMHG | WEIGHT: 122 LBS | SYSTOLIC BLOOD PRESSURE: 134 MMHG | BODY MASS INDEX: 20.3 KG/M2 | OXYGEN SATURATION: 96 % | TEMPERATURE: 97.2 F

## 2023-12-04 DIAGNOSIS — J06.9 VIRAL URI: Primary | ICD-10-CM

## 2023-12-04 DIAGNOSIS — E78.5 HYPERLIPIDEMIA, UNSPECIFIED HYPERLIPIDEMIA TYPE: ICD-10-CM

## 2023-12-04 PROCEDURE — 99214 OFFICE O/P EST MOD 30 MIN: CPT | Performed by: FAMILY MEDICINE

## 2023-12-04 RX ORDER — AZITHROMYCIN 250 MG/1
250 TABLET, FILM COATED ORAL SEE ADMIN INSTRUCTIONS
Qty: 6 TABLET | Refills: 0 | Status: SHIPPED | OUTPATIENT
Start: 2023-12-04 | End: 2023-12-09

## 2023-12-04 RX ORDER — DEXTROMETHORPHAN HYDROBROMIDE AND PROMETHAZINE HYDROCHLORIDE 15; 6.25 MG/5ML; MG/5ML
5 SYRUP ORAL 4 TIMES DAILY PRN
Qty: 240 ML | Refills: 0 | Status: SHIPPED | OUTPATIENT
Start: 2023-12-04 | End: 2023-12-11

## 2023-12-04 RX ORDER — ALBUTEROL SULFATE 90 UG/1
2 AEROSOL, METERED RESPIRATORY (INHALATION) EVERY 6 HOURS PRN
Status: SHIPPED | OUTPATIENT
Start: 2023-12-04

## 2023-12-04 RX ORDER — METHYLPREDNISOLONE 4 MG/1
TABLET ORAL
Qty: 1 KIT | Refills: 0 | Status: SHIPPED | OUTPATIENT
Start: 2023-12-04 | End: 2023-12-10

## 2023-12-04 ASSESSMENT — ENCOUNTER SYMPTOMS
WHEEZING: 1
SHORTNESS OF BREATH: 1
ALLERGIC/IMMUNOLOGIC NEGATIVE: 1
EYES NEGATIVE: 1
GASTROINTESTINAL NEGATIVE: 1
COUGH: 1

## 2023-12-04 NOTE — PATIENT INSTRUCTIONS
Take the DOROTHY-Vito  Hold the statin for 1 week  Albuterol inhaler as needed  Go to the ER if any worse

## 2023-12-04 NOTE — PROGRESS NOTES
Content: Thought content normal.            Labs :    Lab Results   Component Value Date    WBC 4.0 (L) 10/02/2023    HGB 12.2 10/02/2023    HCT 38.6 10/02/2023     10/02/2023    CHOL 188 06/26/2023    TRIG 62 06/26/2023    HDL 72 06/26/2023    ALT 15 10/02/2023    AST 23 10/02/2023     10/02/2023    K 4.2 10/02/2023     10/02/2023    CREATININE 0.6 10/02/2023    BUN 9 10/02/2023    CO2 28 10/02/2023    GLUF 104 06/25/2018    LABA1C 5.9 12/02/2022     No results found for: \"VOL\", \"APPEARANCE\", \"COLORU\", \"LABSPEC\", \"LABPH\", \"LEUKBLD\", \"NITRU\", \"GLUCOSEU\", \"KETUA\", \"UROBILINOGEN\", \"BILIRUBINUR\", \"OCBU\"  No results found for: \"PSA\", \"CEA\", \"\", \"AE9876\", \"\"      Controlled Substances Monitoring:                                    ASSESSMENT     Patient Active Problem List    Diagnosis Date Noted    Abnormal pigmentation of skin 11/15/2021    Iron deficiency anemia secondary to inadequate dietary iron intake 12/20/2018    Elevated cholesterol 04/10/2018        Diagnosis:   1. Viral URI  2. Hyperlipidemia, unspecified hyperlipidemia type       PLAN:   Patient has been coughing and wheezing for the last 1 week  Over-the-counter medication not helpful  Medrol dose  Z-Vito  Atrovent  Patient instructed to go to ER if any worse  Return to clinic earlier if any problems        There are no Patient Instructions on file for this visit. No follow-ups on file. Dorothea Tipton reviewed my findings and recommendations with Reinaldo Lemus.     Electronicallysigned by Zakiya Britt MD on 12/4/23 at 2:25 PM EST

## 2023-12-08 ENCOUNTER — TELEPHONE (OUTPATIENT)
Dept: FAMILY MEDICINE CLINIC | Age: 63
End: 2023-12-08

## 2023-12-08 NOTE — TELEPHONE ENCOUNTER
----- Message from Jose Luis Joslyn sent at 12/5/2023 10:31 AM EST -----  Subject: Message to Provider    QUESTIONS  Information for Provider? Pt Carmel Montalvo was letting Provider Olive Baca know   that she wasn't able to make the appt due to other obligations. Pt just   doesn't want to make it seem like she doesn't want to be seen. No appts   were available until Jan.   ---------------------------------------------------------------------------  --------------  Alcides Garcia [facility] [place]  [phone]; OK to leave message on voicemail  ---------------------------------------------------------------------------  --------------  SCRIPT ANSWERS  Relationship to Patient?  Self

## 2023-12-28 ENCOUNTER — OFFICE VISIT (OUTPATIENT)
Dept: FAMILY MEDICINE CLINIC | Age: 63
End: 2023-12-28
Payer: COMMERCIAL

## 2023-12-28 VITALS
SYSTOLIC BLOOD PRESSURE: 104 MMHG | TEMPERATURE: 97.6 F | DIASTOLIC BLOOD PRESSURE: 62 MMHG | WEIGHT: 121 LBS | BODY MASS INDEX: 20.14 KG/M2 | OXYGEN SATURATION: 99 % | HEART RATE: 83 BPM

## 2023-12-28 DIAGNOSIS — J06.9 VIRAL URI: Primary | ICD-10-CM

## 2023-12-28 DIAGNOSIS — R73.9 HYPERGLYCEMIA: ICD-10-CM

## 2023-12-28 DIAGNOSIS — E78.5 HYPERLIPIDEMIA, UNSPECIFIED HYPERLIPIDEMIA TYPE: ICD-10-CM

## 2023-12-28 LAB
Lab: ABNORMAL
PERFORMING INSTRUMENT: ABNORMAL
QC PASS/FAIL: ABNORMAL
SARS-COV-2, POC: DETECTED

## 2023-12-28 PROCEDURE — 99214 OFFICE O/P EST MOD 30 MIN: CPT | Performed by: FAMILY MEDICINE

## 2023-12-28 PROCEDURE — 87426 SARSCOV CORONAVIRUS AG IA: CPT | Performed by: FAMILY MEDICINE

## 2023-12-28 RX ORDER — METHYLPREDNISOLONE 4 MG/1
TABLET ORAL
Qty: 1 KIT | Refills: 0 | Status: SHIPPED | OUTPATIENT
Start: 2023-12-28 | End: 2024-01-03

## 2023-12-28 RX ORDER — AMOXICILLIN 500 MG/1
TABLET, FILM COATED ORAL
COMMUNITY
Start: 2023-12-21

## 2023-12-28 RX ORDER — DEXTROMETHORPHAN HYDROBROMIDE AND PROMETHAZINE HYDROCHLORIDE 15; 6.25 MG/5ML; MG/5ML
5 SYRUP ORAL 4 TIMES DAILY PRN
Qty: 240 ML | Refills: 0 | Status: SHIPPED | OUTPATIENT
Start: 2023-12-28 | End: 2024-01-04

## 2023-12-28 RX ORDER — AZITHROMYCIN 250 MG/1
250 TABLET, FILM COATED ORAL SEE ADMIN INSTRUCTIONS
Qty: 6 TABLET | Refills: 0 | Status: SHIPPED | OUTPATIENT
Start: 2023-12-28 | End: 2024-01-02

## 2023-12-28 NOTE — PROGRESS NOTES
OFFICE PROGRESS NOTE      SUBJECTIVE:        Patient ID:   Flash Roberts is a 58 y.o. female who presents for   Chief Complaint   Patient presents with    Cough     C/o productive cough,sore throat and body aches x 3 days. Reports she has been on amoxil 500 from a dental procedure. Has one tablet left. HPI:   Complaining of sore throat and cough and bodyaches for the last 3 days  COVID-positive      Prior to Visit Medications    Medication Sig Taking?  Authorizing Provider   amoxicillin (AMOXIL) 500 MG tablet take 1 tablet by mouth three times a day until finished Yes Christy Garcia MD   simvastatin (ZOCOR) 5 MG tablet Take 1 tablet by mouth nightly  Carmel El MD   b complex vitamins capsule Take 1 capsule by mouth daily  Christy Garcia MD   Multiple Vitamins-Iron (MULTIVITAMIN/IRON PO) Take 1 tablet by mouth daily  Christy Garcia MD   aspirin 81 MG tablet Take 1 tablet by mouth daily  Christy Garcia MD   vitamin C (ASCORBIC ACID) 500 MG tablet Take 1 tablet by mouth daily  Christy Garcia MD   vitamin D (CHOLECALCIFEROL) 1000 UNIT TABS tablet Take 1 tablet by mouth daily  Christy Garcia MD      Social History     Socioeconomic History    Marital status:      Spouse name: None    Number of children: None    Years of education: None    Highest education level: None   Tobacco Use    Smoking status: Never    Smokeless tobacco: Never   Vaping Use    Vaping Use: Never used   Substance and Sexual Activity    Alcohol use: No    Drug use: No    Sexual activity: Yes     Partners: Male     Social Determinants of Health     Financial Resource Strain: Low Risk  (10/16/2023)    Overall Financial Resource Strain (CARDIA)     Difficulty of Paying Living Expenses: Not hard at all   Transportation Needs: Unknown (10/16/2023)    PRAPARE - Transportation     Lack of Transportation (Non-Medical): No   Housing Stability: Unknown
Walk in

## 2024-01-04 ENCOUNTER — OFFICE VISIT (OUTPATIENT)
Dept: FAMILY MEDICINE CLINIC | Age: 64
End: 2024-01-04
Payer: MEDICARE

## 2024-01-04 VITALS
BODY MASS INDEX: 19.47 KG/M2 | DIASTOLIC BLOOD PRESSURE: 76 MMHG | WEIGHT: 117 LBS | TEMPERATURE: 97 F | OXYGEN SATURATION: 98 % | HEART RATE: 82 BPM | SYSTOLIC BLOOD PRESSURE: 130 MMHG

## 2024-01-04 DIAGNOSIS — J06.9 VIRAL URI: Primary | ICD-10-CM

## 2024-01-04 DIAGNOSIS — E78.5 HYPERLIPIDEMIA, UNSPECIFIED HYPERLIPIDEMIA TYPE: ICD-10-CM

## 2024-01-04 PROCEDURE — 99214 OFFICE O/P EST MOD 30 MIN: CPT | Performed by: FAMILY MEDICINE

## 2024-01-04 ASSESSMENT — PATIENT HEALTH QUESTIONNAIRE - PHQ9
2. FEELING DOWN, DEPRESSED OR HOPELESS: 0
SUM OF ALL RESPONSES TO PHQ QUESTIONS 1-9: 0
SUM OF ALL RESPONSES TO PHQ QUESTIONS 1-9: 0
SUM OF ALL RESPONSES TO PHQ9 QUESTIONS 1 & 2: 0
SUM OF ALL RESPONSES TO PHQ QUESTIONS 1-9: 0
SUM OF ALL RESPONSES TO PHQ QUESTIONS 1-9: 0
1. LITTLE INTEREST OR PLEASURE IN DOING THINGS: 0

## 2024-01-04 ASSESSMENT — ENCOUNTER SYMPTOMS
GASTROINTESTINAL NEGATIVE: 1
ALLERGIC/IMMUNOLOGIC NEGATIVE: 1
COUGH: 1
EYES NEGATIVE: 1

## 2024-01-04 NOTE — PROGRESS NOTES
OFFICE PROGRESS NOTE      SUBJECTIVE:        Patient ID:   Yocasta Mendoza is a 63 y.o. female who presents for   Chief Complaint   Patient presents with    URI     Here for recheck. Continues to experience a slightly productive cough and sinus drainage.           HPI:   Patient here for the follow-up  Still complaining of cough and congestion  No fever  No shortness of breath        Prior to Visit Medications    Medication Sig Taking? Authorizing Provider   promethazine-dextromethorphan (PROMETHAZINE-DM) 6.25-15 MG/5ML syrup Take 5 mLs by mouth 4 times daily as needed for Cough Yes Andrew Vega MD   simvastatin (ZOCOR) 5 MG tablet Take 1 tablet by mouth nightly Yes Andrew Vega MD   b complex vitamins capsule Take 1 capsule by mouth daily Yes Christy Garcia MD   Multiple Vitamins-Iron (MULTIVITAMIN/IRON PO) Take 1 tablet by mouth daily Yes Christy Garcia MD   aspirin 81 MG tablet Take 1 tablet by mouth daily Yes Christy Garcia MD   vitamin C (ASCORBIC ACID) 500 MG tablet Take 1 tablet by mouth daily Yes Christy Garcia MD   vitamin D (CHOLECALCIFEROL) 1000 UNIT TABS tablet Take 1 tablet by mouth daily Yes Christy Garcia MD      Social History     Socioeconomic History    Marital status:      Spouse name: None    Number of children: None    Years of education: None    Highest education level: None   Tobacco Use    Smoking status: Never    Smokeless tobacco: Never   Vaping Use    Vaping Use: Never used   Substance and Sexual Activity    Alcohol use: No    Drug use: No    Sexual activity: Yes     Partners: Male     Social Determinants of Health     Financial Resource Strain: Low Risk  (10/16/2023)    Overall Financial Resource Strain (CARDIA)     Difficulty of Paying Living Expenses: Not hard at all   Transportation Needs: Unknown (10/16/2023)    PRAPARE - Transportation     Lack of Transportation (Non-Medical): No   Housing

## 2024-01-04 NOTE — PATIENT INSTRUCTIONS
Continue present treatment  Restart the statin  Continue follow-up manage  Return to clinic earlier if any problems

## 2024-04-08 DIAGNOSIS — E78.5 HYPERLIPIDEMIA, UNSPECIFIED HYPERLIPIDEMIA TYPE: ICD-10-CM

## 2024-04-08 DIAGNOSIS — R73.9 HYPERGLYCEMIA: ICD-10-CM

## 2024-04-08 LAB
ALBUMIN SERPL-MCNC: 4.3 G/DL (ref 3.5–5.2)
ALP BLD-CCNC: 55 U/L (ref 35–104)
ALT SERPL-CCNC: 23 U/L (ref 0–32)
ANION GAP SERPL CALCULATED.3IONS-SCNC: 14 MMOL/L (ref 7–16)
AST SERPL-CCNC: 26 U/L (ref 0–31)
BILIRUB SERPL-MCNC: 0.2 MG/DL (ref 0–1.2)
BUN BLDV-MCNC: 13 MG/DL (ref 6–23)
CALCIUM SERPL-MCNC: 9.8 MG/DL (ref 8.6–10.2)
CHLORIDE BLD-SCNC: 101 MMOL/L (ref 98–107)
CHOLESTEROL: 200 MG/DL
CO2: 25 MMOL/L (ref 22–29)
CREAT SERPL-MCNC: 0.7 MG/DL (ref 0.5–1)
GFR SERPL CREATININE-BSD FRML MDRD: >90 ML/MIN/1.73M2
GLUCOSE BLD-MCNC: 98 MG/DL (ref 74–99)
HDLC SERPL-MCNC: 72 MG/DL
LDL CHOLESTEROL: 119 MG/DL
POTASSIUM SERPL-SCNC: 4.4 MMOL/L (ref 3.5–5)
SODIUM BLD-SCNC: 140 MMOL/L (ref 132–146)
TOTAL PROTEIN: 6.8 G/DL (ref 6.4–8.3)
TRIGL SERPL-MCNC: 43 MG/DL
VLDLC SERPL CALC-MCNC: 9 MG/DL

## 2024-04-15 ENCOUNTER — OFFICE VISIT (OUTPATIENT)
Dept: FAMILY MEDICINE CLINIC | Age: 64
End: 2024-04-15
Payer: MEDICARE

## 2024-04-15 VITALS
BODY MASS INDEX: 20.14 KG/M2 | DIASTOLIC BLOOD PRESSURE: 72 MMHG | SYSTOLIC BLOOD PRESSURE: 122 MMHG | WEIGHT: 121 LBS | HEART RATE: 79 BPM | TEMPERATURE: 98.1 F | OXYGEN SATURATION: 96 %

## 2024-04-15 DIAGNOSIS — D50.8 IRON DEFICIENCY ANEMIA SECONDARY TO INADEQUATE DIETARY IRON INTAKE: ICD-10-CM

## 2024-04-15 DIAGNOSIS — E78.5 HYPERLIPIDEMIA, UNSPECIFIED HYPERLIPIDEMIA TYPE: Primary | ICD-10-CM

## 2024-04-15 DIAGNOSIS — R73.9 HYPERGLYCEMIA: ICD-10-CM

## 2024-04-15 PROCEDURE — 99214 OFFICE O/P EST MOD 30 MIN: CPT | Performed by: FAMILY MEDICINE

## 2024-04-15 NOTE — PATIENT INSTRUCTIONS
Continue present treatment  Low carbohydrate low-fat diet  Regular exercises  Lab work before the next visit  Follow-up with the gynecologist  Return to clinic earlier if any problems

## 2024-04-15 NOTE — PROGRESS NOTES
OFFICE PROGRESS NOTE      SUBJECTIVE:        Patient ID:   Yocasta Mendoza is a 63 y.o. female who presents for   Chief Complaint   Patient presents with    Hyperlipidemia     Here for recheck on Hyperlipidemia. Lab work done,here for review.           HPI:   Patient here for the follow-up  States she is feeling okay  Lab review  LDL is 119  Rest of the lab work is normal  Fasting sugar 98 this time  Blood pressure stable 122/72  CBC not done  Prior to Visit Medications    Medication Sig Taking? Authorizing Provider   simvastatin (ZOCOR) 5 MG tablet Take 1 tablet by mouth nightly Yes Andrew Vega MD   b complex vitamins capsule Take 1 capsule by mouth daily Yes Christy Garcia MD   Multiple Vitamins-Iron (MULTIVITAMIN/IRON PO) Take 1 tablet by mouth daily Yes Christy Garcia MD   aspirin 81 MG tablet Take 1 tablet by mouth daily Yes Christy Garcia MD   vitamin C (ASCORBIC ACID) 500 MG tablet Take 1 tablet by mouth daily Yes Christy Garcia MD   vitamin D (CHOLECALCIFEROL) 1000 UNIT TABS tablet Take 1 tablet by mouth daily Yes Christy Garcia MD      Social History     Socioeconomic History    Marital status:      Spouse name: None    Number of children: None    Years of education: None    Highest education level: None   Tobacco Use    Smoking status: Never    Smokeless tobacco: Never   Vaping Use    Vaping Use: Never used   Substance and Sexual Activity    Alcohol use: No    Drug use: No    Sexual activity: Yes     Partners: Male     Social Determinants of Health     Financial Resource Strain: Low Risk  (10/16/2023)    Overall Financial Resource Strain (CARDIA)     Difficulty of Paying Living Expenses: Not hard at all   Transportation Needs: Unknown (10/16/2023)    PRAPARE - Transportation     Lack of Transportation (Non-Medical): No   Housing Stability: Unknown (10/16/2023)    Housing Stability Vital Sign     Unstable Housing in

## 2024-07-08 DIAGNOSIS — D50.8 IRON DEFICIENCY ANEMIA SECONDARY TO INADEQUATE DIETARY IRON INTAKE: ICD-10-CM

## 2024-07-08 DIAGNOSIS — R73.9 HYPERGLYCEMIA: ICD-10-CM

## 2024-07-08 DIAGNOSIS — E78.5 HYPERLIPIDEMIA, UNSPECIFIED HYPERLIPIDEMIA TYPE: ICD-10-CM

## 2024-07-08 LAB
ALBUMIN: 4.4 G/DL (ref 3.5–5.2)
ALP BLD-CCNC: 54 U/L (ref 35–104)
ALT SERPL-CCNC: 17 U/L (ref 0–32)
ANION GAP SERPL CALCULATED.3IONS-SCNC: 9 MMOL/L (ref 7–16)
AST SERPL-CCNC: 24 U/L (ref 0–31)
BASOPHILS ABSOLUTE: 0.05 K/UL (ref 0–0.2)
BASOPHILS RELATIVE PERCENT: 1 % (ref 0–2)
BILIRUB SERPL-MCNC: 0.3 MG/DL (ref 0–1.2)
BUN BLDV-MCNC: 13 MG/DL (ref 6–23)
CALCIUM SERPL-MCNC: 9.3 MG/DL (ref 8.6–10.2)
CHLORIDE BLD-SCNC: 102 MMOL/L (ref 98–107)
CHOLESTEROL, TOTAL: 191 MG/DL
CO2: 28 MMOL/L (ref 22–29)
CREAT SERPL-MCNC: 0.7 MG/DL (ref 0.5–1)
EOSINOPHILS ABSOLUTE: 0.14 K/UL (ref 0.05–0.5)
EOSINOPHILS RELATIVE PERCENT: 3 % (ref 0–6)
GFR, ESTIMATED: >90 ML/MIN/1.73M2
GLUCOSE BLD-MCNC: 100 MG/DL (ref 74–99)
HCT VFR BLD CALC: 38.8 % (ref 34–48)
HDLC SERPL-MCNC: 78 MG/DL
HEMOGLOBIN: 12.2 G/DL (ref 11.5–15.5)
IMMATURE GRANULOCYTES %: 1 % (ref 0–5)
IMMATURE GRANULOCYTES ABSOLUTE: <0.03 K/UL (ref 0–0.58)
LDL CHOLESTEROL: 102 MG/DL
LYMPHOCYTES ABSOLUTE: 1.67 K/UL (ref 1.5–4)
LYMPHOCYTES RELATIVE PERCENT: 39 % (ref 20–42)
MCH RBC QN AUTO: 30.3 PG (ref 26–35)
MCHC RBC AUTO-ENTMCNC: 31.4 G/DL (ref 32–34.5)
MCV RBC AUTO: 96.5 FL (ref 80–99.9)
MONOCYTES ABSOLUTE: 0.39 K/UL (ref 0.1–0.95)
MONOCYTES RELATIVE PERCENT: 9 % (ref 2–12)
NEUTROPHILS ABSOLUTE: 2.04 K/UL (ref 1.8–7.3)
NEUTROPHILS RELATIVE PERCENT: 47 % (ref 43–80)
PDW BLD-RTO: 13.8 % (ref 11.5–15)
PLATELET # BLD: 294 K/UL (ref 130–450)
PMV BLD AUTO: 10.1 FL (ref 7–12)
POTASSIUM SERPL-SCNC: 4.3 MMOL/L (ref 3.5–5)
RBC # BLD: 4.02 M/UL (ref 3.5–5.5)
SODIUM BLD-SCNC: 139 MMOL/L (ref 132–146)
TOTAL PROTEIN: 6.9 G/DL (ref 6.4–8.3)
TRIGL SERPL-MCNC: 54 MG/DL
VLDLC SERPL CALC-MCNC: 11 MG/DL
WBC # BLD: 4.3 K/UL (ref 4.5–11.5)

## 2024-07-22 ENCOUNTER — OFFICE VISIT (OUTPATIENT)
Dept: FAMILY MEDICINE CLINIC | Age: 64
End: 2024-07-22
Payer: COMMERCIAL

## 2024-07-22 VITALS
HEIGHT: 64 IN | WEIGHT: 121 LBS | DIASTOLIC BLOOD PRESSURE: 70 MMHG | OXYGEN SATURATION: 98 % | BODY MASS INDEX: 20.66 KG/M2 | HEART RATE: 92 BPM | TEMPERATURE: 97.3 F | SYSTOLIC BLOOD PRESSURE: 130 MMHG

## 2024-07-22 VITALS
BODY MASS INDEX: 20.66 KG/M2 | WEIGHT: 121 LBS | SYSTOLIC BLOOD PRESSURE: 130 MMHG | HEART RATE: 92 BPM | OXYGEN SATURATION: 98 % | DIASTOLIC BLOOD PRESSURE: 70 MMHG | HEIGHT: 64 IN | TEMPERATURE: 97.3 F

## 2024-07-22 DIAGNOSIS — Z00.00 INITIAL MEDICARE ANNUAL WELLNESS VISIT: Primary | ICD-10-CM

## 2024-07-22 DIAGNOSIS — E78.5 HYPERLIPIDEMIA, UNSPECIFIED HYPERLIPIDEMIA TYPE: Primary | ICD-10-CM

## 2024-07-22 DIAGNOSIS — R73.9 HYPERGLYCEMIA: ICD-10-CM

## 2024-07-22 PROCEDURE — 99214 OFFICE O/P EST MOD 30 MIN: CPT | Performed by: FAMILY MEDICINE

## 2024-07-22 PROCEDURE — G8427 DOCREV CUR MEDS BY ELIG CLIN: HCPCS | Performed by: FAMILY MEDICINE

## 2024-07-22 PROCEDURE — 3017F COLORECTAL CA SCREEN DOC REV: CPT | Performed by: FAMILY MEDICINE

## 2024-07-22 PROCEDURE — G0438 PPPS, INITIAL VISIT: HCPCS | Performed by: FAMILY MEDICINE

## 2024-07-22 PROCEDURE — 1036F TOBACCO NON-USER: CPT | Performed by: FAMILY MEDICINE

## 2024-07-22 PROCEDURE — G8420 CALC BMI NORM PARAMETERS: HCPCS | Performed by: FAMILY MEDICINE

## 2024-07-22 ASSESSMENT — ENCOUNTER SYMPTOMS
RESPIRATORY NEGATIVE: 1
EYES NEGATIVE: 1
ALLERGIC/IMMUNOLOGIC NEGATIVE: 1
GASTROINTESTINAL NEGATIVE: 1

## 2024-07-22 NOTE — PATIENT INSTRUCTIONS
attack. These may include:    Chest pain or pressure, or a strange feeling in the chest.     Sweating.     Shortness of breath.     Pain, pressure, or a strange feeling in the back, neck, jaw, or upper belly or in one or both shoulders or arms.     Lightheadedness or sudden weakness.     A fast or irregular heartbeat.   After you call 911, the  may tell you to chew 1 adult-strength or 2 to 4 low-dose aspirin. Wait for an ambulance. Do not try to drive yourself.  Watch closely for changes in your health, and be sure to contact your doctor if you have any problems.  Where can you learn more?  Go to https://www.[employer].net/patientEd and enter F075 to learn more about \"A Healthy Heart: Care Instructions.\"  Current as of: June 24, 2023  Content Version: 14.1  © 6400-2219 [employer].   Care instructions adapted under license by [employer]. If you have questions about a medical condition or this instruction, always ask your healthcare professional. [employer] disclaims any warranty or liability for your use of this information.      Personalized Preventive Plan for Yocasta Mendoza - 7/22/2024  Medicare offers a range of preventive health benefits. Some of the tests and screenings are paid in full while other may be subject to a deductible, co-insurance, and/or copay.    Some of these benefits include a comprehensive review of your medical history including lifestyle, illnesses that may run in your family, and various assessments and screenings as appropriate.    After reviewing your medical record and screening and assessments performed today your provider may have ordered immunizations, labs, imaging, and/or referrals for you.  A list of these orders (if applicable) as well as your Preventive Care list are included within your After Visit Summary for your review.    Other Preventive Recommendations:    A preventive eye exam performed by an eye specialist is recommended every 1-2

## 2024-07-22 NOTE — PROGRESS NOTES
care):   Patient Care Team:  Andrew Vega MD as PCP - General (Family Medicine)  Andrew Vega MD as PCP - Empaneled Provider      Reviewed and updated this visit:  Tobacco  Allergies  Meds  Med Hx  Surg Hx  Soc Hx  Fam Hx

## 2024-07-22 NOTE — PROGRESS NOTES
OFFICE PROGRESS NOTE      SUBJECTIVE:        Patient ID:   Yocasta Mendoza is a 63 y.o. female who presents for   Chief Complaint   Patient presents with    Hyperlipidemia     Here for recheck on Hyperlipidemia. Lab work done,here for review.Refuses mammogram.           HPI:   Patient here for the follow-up  States she is feeling good  Blood test showed sugar 100    CBC within normal limits    Prior to Visit Medications    Medication Sig Taking? Authorizing Provider   simvastatin (ZOCOR) 5 MG tablet Take 1 tablet by mouth nightly Yes Andrew Vega MD   b complex vitamins capsule Take 1 capsule by mouth daily Yes Christy Garcia MD   Multiple Vitamins-Iron (MULTIVITAMIN/IRON PO) Take 1 tablet by mouth daily Yes Christy Garcia MD   vitamin C (ASCORBIC ACID) 500 MG tablet Take 1 tablet by mouth daily Yes Christy Garcia MD   vitamin D (CHOLECALCIFEROL) 1000 UNIT TABS tablet Take 1 tablet by mouth daily Yes Christy Garcia MD   aspirin 81 MG tablet Take 1 tablet by mouth daily  Patient not taking: Reported on 7/22/2024  Christy Garcia MD      Social History     Socioeconomic History    Marital status:      Spouse name: None    Number of children: None    Years of education: None    Highest education level: None   Tobacco Use    Smoking status: Never    Smokeless tobacco: Never   Vaping Use    Vaping Use: Never used   Substance and Sexual Activity    Alcohol use: No    Drug use: No    Sexual activity: Yes     Partners: Male     Social Determinants of Health     Financial Resource Strain: Low Risk  (10/16/2023)    Overall Financial Resource Strain (CARDIA)     Difficulty of Paying Living Expenses: Not hard at all   Transportation Needs: Unknown (10/16/2023)    PRAPARE - Transportation     Lack of Transportation (Non-Medical): No   Housing Stability: Unknown (10/16/2023)    Housing Stability Vital Sign     Unstable Housing in the

## 2024-07-22 NOTE — PATIENT INSTRUCTIONS
Continue present treatment  Low-sodium low-fat low-carb diet  Regular exercises  Lab work before the next visit  Follow-up with the gynecology  Return to clinic earlier if any problems

## 2024-07-26 DIAGNOSIS — E78.5 HYPERLIPIDEMIA, UNSPECIFIED HYPERLIPIDEMIA TYPE: ICD-10-CM

## 2024-07-26 RX ORDER — SIMVASTATIN 5 MG
5 TABLET ORAL NIGHTLY
Qty: 90 TABLET | Refills: 1 | Status: SHIPPED | OUTPATIENT
Start: 2024-07-26

## 2024-07-26 NOTE — TELEPHONE ENCOUNTER
.Last seen 7/22/2024  Next appt Visit date not found    Requested Prescriptions     Pending Prescriptions Disp Refills    simvastatin (ZOCOR) 5 MG tablet 90 tablet 1     Sig: Take 1 tablet by mouth nightly

## 2024-07-29 DIAGNOSIS — E78.5 HYPERLIPIDEMIA, UNSPECIFIED HYPERLIPIDEMIA TYPE: ICD-10-CM

## 2024-07-29 RX ORDER — SIMVASTATIN 5 MG
5 TABLET ORAL NIGHTLY
Qty: 90 TABLET | Refills: 0 | Status: SHIPPED | OUTPATIENT
Start: 2024-07-29

## 2024-07-29 NOTE — TELEPHONE ENCOUNTER
Patient needs this refill. It was sent to the wrong pharmacy by mistake.    Please send refill to [facility] pharmacy asap.    Last seen 7/22/2024  Next appt Visit date not found    Requested Prescriptions      No prescriptions requested or ordered in this encounter      [facility]

## 2024-08-19 DIAGNOSIS — Z12.11 SCREEN FOR COLON CANCER: Primary | ICD-10-CM

## 2024-08-19 LAB
CONTROL: NORMAL
FECAL BLOOD IMMUNOCHEMICAL TEST: NORMAL

## 2024-08-19 PROCEDURE — 82274 ASSAY TEST FOR BLOOD FECAL: CPT | Performed by: FAMILY MEDICINE

## 2025-02-11 DIAGNOSIS — R73.9 HYPERGLYCEMIA: ICD-10-CM

## 2025-02-11 DIAGNOSIS — E78.5 HYPERLIPIDEMIA, UNSPECIFIED HYPERLIPIDEMIA TYPE: ICD-10-CM

## 2025-02-11 LAB
ALBUMIN: 4.3 G/DL (ref 3.5–5.2)
ALP BLD-CCNC: 58 U/L (ref 35–104)
ALT SERPL-CCNC: 17 U/L (ref 0–32)
ANION GAP SERPL CALCULATED.3IONS-SCNC: 13 MMOL/L (ref 7–16)
AST SERPL-CCNC: 24 U/L (ref 0–31)
BASOPHILS ABSOLUTE: 0.05 K/UL (ref 0–0.2)
BASOPHILS RELATIVE PERCENT: 1 % (ref 0–2)
BILIRUB SERPL-MCNC: 0.2 MG/DL (ref 0–1.2)
BUN BLDV-MCNC: 14 MG/DL (ref 6–23)
CALCIUM SERPL-MCNC: 9.2 MG/DL (ref 8.6–10.2)
CHLORIDE BLD-SCNC: 104 MMOL/L (ref 98–107)
CHOLESTEROL, TOTAL: 193 MG/DL
CO2: 25 MMOL/L (ref 22–29)
CREAT SERPL-MCNC: 0.8 MG/DL (ref 0.5–1)
EOSINOPHILS ABSOLUTE: 0.13 K/UL (ref 0.05–0.5)
EOSINOPHILS RELATIVE PERCENT: 3 % (ref 0–6)
GFR, ESTIMATED: 82 ML/MIN/1.73M2
GLUCOSE BLD-MCNC: 99 MG/DL (ref 74–99)
HCT VFR BLD CALC: 38.1 % (ref 34–48)
HDLC SERPL-MCNC: 72 MG/DL
HEMOGLOBIN: 12 G/DL (ref 11.5–15.5)
IMMATURE GRANULOCYTES %: 0 % (ref 0–5)
IMMATURE GRANULOCYTES ABSOLUTE: <0.03 K/UL (ref 0–0.58)
LDL CHOLESTEROL: 107 MG/DL
LYMPHOCYTES ABSOLUTE: 1.94 K/UL (ref 1.5–4)
LYMPHOCYTES RELATIVE PERCENT: 42 % (ref 20–42)
MCH RBC QN AUTO: 30 PG (ref 26–35)
MCHC RBC AUTO-ENTMCNC: 31.5 G/DL (ref 32–34.5)
MCV RBC AUTO: 95.3 FL (ref 80–99.9)
MONOCYTES ABSOLUTE: 0.39 K/UL (ref 0.1–0.95)
MONOCYTES RELATIVE PERCENT: 8 % (ref 2–12)
NEUTROPHILS ABSOLUTE: 2.14 K/UL (ref 1.8–7.3)
NEUTROPHILS RELATIVE PERCENT: 46 % (ref 43–80)
PDW BLD-RTO: 13.6 % (ref 11.5–15)
PLATELET # BLD: 272 K/UL (ref 130–450)
PMV BLD AUTO: 10.3 FL (ref 7–12)
POTASSIUM SERPL-SCNC: 4.6 MMOL/L (ref 3.5–5)
RBC # BLD: 4 M/UL (ref 3.5–5.5)
SODIUM BLD-SCNC: 142 MMOL/L (ref 132–146)
TOTAL PROTEIN: 6.6 G/DL (ref 6.4–8.3)
TRIGL SERPL-MCNC: 70 MG/DL
VLDLC SERPL CALC-MCNC: 14 MG/DL
WBC # BLD: 4.7 K/UL (ref 4.5–11.5)

## 2025-02-17 ENCOUNTER — OFFICE VISIT (OUTPATIENT)
Dept: FAMILY MEDICINE CLINIC | Age: 65
End: 2025-02-17
Payer: MEDICARE

## 2025-02-17 VITALS
DIASTOLIC BLOOD PRESSURE: 70 MMHG | HEIGHT: 64 IN | RESPIRATION RATE: 18 BRPM | WEIGHT: 124 LBS | HEART RATE: 74 BPM | BODY MASS INDEX: 21.17 KG/M2 | SYSTOLIC BLOOD PRESSURE: 120 MMHG | OXYGEN SATURATION: 98 %

## 2025-02-17 DIAGNOSIS — E78.5 HYPERLIPIDEMIA, UNSPECIFIED HYPERLIPIDEMIA TYPE: ICD-10-CM

## 2025-02-17 DIAGNOSIS — Z53.20 BREAST SCREENING DECLINED: Primary | ICD-10-CM

## 2025-02-17 DIAGNOSIS — R73.9 HYPERGLYCEMIA: ICD-10-CM

## 2025-02-17 PROCEDURE — G8427 DOCREV CUR MEDS BY ELIG CLIN: HCPCS | Performed by: FAMILY MEDICINE

## 2025-02-17 PROCEDURE — G8420 CALC BMI NORM PARAMETERS: HCPCS | Performed by: FAMILY MEDICINE

## 2025-02-17 PROCEDURE — G8400 PT W/DXA NO RESULTS DOC: HCPCS | Performed by: FAMILY MEDICINE

## 2025-02-17 PROCEDURE — 3017F COLORECTAL CA SCREEN DOC REV: CPT | Performed by: FAMILY MEDICINE

## 2025-02-17 PROCEDURE — 1123F ACP DISCUSS/DSCN MKR DOCD: CPT | Performed by: FAMILY MEDICINE

## 2025-02-17 PROCEDURE — 1090F PRES/ABSN URINE INCON ASSESS: CPT | Performed by: FAMILY MEDICINE

## 2025-02-17 PROCEDURE — 1036F TOBACCO NON-USER: CPT | Performed by: FAMILY MEDICINE

## 2025-02-17 PROCEDURE — 99214 OFFICE O/P EST MOD 30 MIN: CPT | Performed by: FAMILY MEDICINE

## 2025-02-17 RX ORDER — SIMVASTATIN 5 MG
5 TABLET ORAL NIGHTLY
Qty: 90 TABLET | Refills: 1 | Status: SHIPPED | OUTPATIENT
Start: 2025-02-17

## 2025-02-17 SDOH — ECONOMIC STABILITY: FOOD INSECURITY: WITHIN THE PAST 12 MONTHS, YOU WORRIED THAT YOUR FOOD WOULD RUN OUT BEFORE YOU GOT MONEY TO BUY MORE.: NEVER TRUE

## 2025-02-17 SDOH — ECONOMIC STABILITY: FOOD INSECURITY: WITHIN THE PAST 12 MONTHS, THE FOOD YOU BOUGHT JUST DIDN'T LAST AND YOU DIDN'T HAVE MONEY TO GET MORE.: NEVER TRUE

## 2025-02-17 ASSESSMENT — PATIENT HEALTH QUESTIONNAIRE - PHQ9
SUM OF ALL RESPONSES TO PHQ9 QUESTIONS 1 & 2: 0
SUM OF ALL RESPONSES TO PHQ QUESTIONS 1-9: 0
2. FEELING DOWN, DEPRESSED OR HOPELESS: NOT AT ALL
1. LITTLE INTEREST OR PLEASURE IN DOING THINGS: NOT AT ALL
SUM OF ALL RESPONSES TO PHQ QUESTIONS 1-9: 0

## 2025-02-17 ASSESSMENT — ENCOUNTER SYMPTOMS
GASTROINTESTINAL NEGATIVE: 1
ALLERGIC/IMMUNOLOGIC NEGATIVE: 1
EYES NEGATIVE: 1
RESPIRATORY NEGATIVE: 1

## 2025-02-17 NOTE — PROGRESS NOTES
treatment  Low-carb low-fat diet  Regular exercises  Follow the gynecology  Lab work before the next visit  Return to clinic earlier if any problems    Return in about 6 months (around 8/17/2025) for Medication Check, test results.         Brian reviewed my findings and recommendations with Yocasta Mendoza.    Electronicallysigned by Andrew Vega MD on 2/17/25 at 2:29 PM EST

## 2025-02-17 NOTE — PATIENT INSTRUCTIONS
Continue present treatment  Low-carb low-fat diet  Regular exercises  Follow the gynecology  Lab work before the next visit  Return to clinic earlier if any problems

## 2025-03-10 DIAGNOSIS — E78.5 HYPERLIPIDEMIA, UNSPECIFIED HYPERLIPIDEMIA TYPE: ICD-10-CM

## 2025-03-17 RX ORDER — SIMVASTATIN 5 MG
TABLET ORAL
Refills: 0 | OUTPATIENT
Start: 2025-03-17

## 2025-04-14 ENCOUNTER — HOSPITAL ENCOUNTER (OUTPATIENT)
Dept: MAMMOGRAPHY | Age: 65
Discharge: HOME | End: 2025-04-16
Attending: OBSTETRICS & GYNECOLOGY
Payer: MEDICARE

## 2025-04-14 DIAGNOSIS — Z12.31 SCREENING MAMMOGRAM FOR BREAST CANCER: ICD-10-CM

## 2025-04-14 PROCEDURE — 77067 SCR MAMMO BI INCL CAD: CPT

## 2025-08-12 DIAGNOSIS — R73.9 HYPERGLYCEMIA: ICD-10-CM

## 2025-08-12 DIAGNOSIS — E78.5 HYPERLIPIDEMIA, UNSPECIFIED HYPERLIPIDEMIA TYPE: ICD-10-CM

## 2025-08-12 LAB
ALBUMIN: 4.3 G/DL (ref 3.5–5.2)
ALP BLD-CCNC: 53 U/L (ref 35–104)
ALT SERPL-CCNC: 24 U/L (ref 0–35)
ANION GAP SERPL CALCULATED.3IONS-SCNC: 11 MMOL/L (ref 7–16)
AST SERPL-CCNC: 31 U/L (ref 0–35)
BASOPHILS ABSOLUTE: 0.04 K/UL (ref 0–0.2)
BASOPHILS RELATIVE PERCENT: 1 % (ref 0–2)
BILIRUB SERPL-MCNC: 0.3 MG/DL (ref 0–1.2)
BUN BLDV-MCNC: 11 MG/DL (ref 8–23)
CALCIUM SERPL-MCNC: 9.6 MG/DL (ref 8.8–10.2)
CHLORIDE BLD-SCNC: 103 MMOL/L (ref 98–107)
CHOLESTEROL, TOTAL: 176 MG/DL
CO2: 26 MMOL/L (ref 22–29)
CREAT SERPL-MCNC: 0.7 MG/DL (ref 0.5–1)
EOSINOPHILS ABSOLUTE: 0.15 K/UL (ref 0.05–0.5)
EOSINOPHILS RELATIVE PERCENT: 3 % (ref 0–6)
GFR, ESTIMATED: >90 ML/MIN/1.73M2
GLUCOSE BLD-MCNC: 104 MG/DL (ref 74–99)
HBA1C MFR BLD: 5.6 % (ref 4–5.6)
HCT VFR BLD CALC: 37.6 % (ref 34–48)
HDLC SERPL-MCNC: 74 MG/DL
HEMOGLOBIN: 12.2 G/DL (ref 11.5–15.5)
IMMATURE GRANULOCYTES %: 0 % (ref 0–5)
IMMATURE GRANULOCYTES ABSOLUTE: <0.03 K/UL (ref 0–0.58)
LDL CHOLESTEROL: 90 MG/DL
LYMPHOCYTES ABSOLUTE: 1.81 K/UL (ref 1.5–4)
LYMPHOCYTES RELATIVE PERCENT: 41 % (ref 20–42)
MCH RBC QN AUTO: 30.7 PG (ref 26–35)
MCHC RBC AUTO-ENTMCNC: 32.4 G/DL (ref 32–34.5)
MCV RBC AUTO: 94.7 FL (ref 80–99.9)
MONOCYTES ABSOLUTE: 0.43 K/UL (ref 0.1–0.95)
MONOCYTES RELATIVE PERCENT: 10 % (ref 2–12)
NEUTROPHILS ABSOLUTE: 2.03 K/UL (ref 1.8–7.3)
NEUTROPHILS RELATIVE PERCENT: 45 % (ref 43–80)
PDW BLD-RTO: 13.5 % (ref 11.5–15)
PLATELET # BLD: 249 K/UL (ref 130–450)
PMV BLD AUTO: 10.3 FL (ref 7–12)
POTASSIUM SERPL-SCNC: 4.2 MMOL/L (ref 3.5–5.1)
RBC # BLD: 3.97 M/UL (ref 3.5–5.5)
SODIUM BLD-SCNC: 141 MMOL/L (ref 136–145)
TOTAL PROTEIN: 6.9 G/DL (ref 6.4–8.3)
TRIGL SERPL-MCNC: 60 MG/DL
VLDLC SERPL CALC-MCNC: 12 MG/DL
WBC # BLD: 4.5 K/UL (ref 4.5–11.5)

## 2025-08-20 ENCOUNTER — OFFICE VISIT (OUTPATIENT)
Dept: FAMILY MEDICINE CLINIC | Age: 65
End: 2025-08-20
Payer: MEDICARE

## 2025-08-20 VITALS
WEIGHT: 126 LBS | BODY MASS INDEX: 21.63 KG/M2 | SYSTOLIC BLOOD PRESSURE: 116 MMHG | TEMPERATURE: 97.4 F | OXYGEN SATURATION: 97 % | HEART RATE: 95 BPM | DIASTOLIC BLOOD PRESSURE: 68 MMHG

## 2025-08-20 VITALS
TEMPERATURE: 97.4 F | BODY MASS INDEX: 21.51 KG/M2 | OXYGEN SATURATION: 95 % | WEIGHT: 126 LBS | HEIGHT: 64 IN | SYSTOLIC BLOOD PRESSURE: 110 MMHG | DIASTOLIC BLOOD PRESSURE: 68 MMHG | HEART RATE: 74 BPM

## 2025-08-20 DIAGNOSIS — Z00.00 MEDICARE ANNUAL WELLNESS VISIT, SUBSEQUENT: Primary | ICD-10-CM

## 2025-08-20 DIAGNOSIS — R73.9 HYPERGLYCEMIA: ICD-10-CM

## 2025-08-20 DIAGNOSIS — E78.5 HYPERLIPIDEMIA, UNSPECIFIED HYPERLIPIDEMIA TYPE: Primary | ICD-10-CM

## 2025-08-20 PROCEDURE — 99214 OFFICE O/P EST MOD 30 MIN: CPT | Performed by: FAMILY MEDICINE

## 2025-08-20 PROCEDURE — G0439 PPPS, SUBSEQ VISIT: HCPCS | Performed by: FAMILY MEDICINE

## 2025-08-20 PROCEDURE — 1036F TOBACCO NON-USER: CPT | Performed by: FAMILY MEDICINE

## 2025-08-20 PROCEDURE — 3017F COLORECTAL CA SCREEN DOC REV: CPT | Performed by: FAMILY MEDICINE

## 2025-08-20 PROCEDURE — 1090F PRES/ABSN URINE INCON ASSESS: CPT | Performed by: FAMILY MEDICINE

## 2025-08-20 PROCEDURE — G8427 DOCREV CUR MEDS BY ELIG CLIN: HCPCS | Performed by: FAMILY MEDICINE

## 2025-08-20 PROCEDURE — 1123F ACP DISCUSS/DSCN MKR DOCD: CPT | Performed by: FAMILY MEDICINE

## 2025-08-20 PROCEDURE — G8420 CALC BMI NORM PARAMETERS: HCPCS | Performed by: FAMILY MEDICINE

## 2025-08-20 PROCEDURE — G8400 PT W/DXA NO RESULTS DOC: HCPCS | Performed by: FAMILY MEDICINE

## 2025-08-20 RX ORDER — SIMVASTATIN 5 MG
5 TABLET ORAL NIGHTLY
Qty: 90 TABLET | Refills: 1 | Status: SHIPPED | OUTPATIENT
Start: 2025-08-20 | End: 2025-08-20

## 2025-08-20 RX ORDER — SIMVASTATIN 5 MG
5 TABLET ORAL NIGHTLY
Qty: 90 TABLET | Refills: 1 | Status: SHIPPED | OUTPATIENT
Start: 2025-08-20 | End: 2025-08-22 | Stop reason: SDUPTHER

## 2025-08-20 ASSESSMENT — LIFESTYLE VARIABLES
HOW OFTEN DO YOU HAVE A DRINK CONTAINING ALCOHOL: NEVER
HOW MANY STANDARD DRINKS CONTAINING ALCOHOL DO YOU HAVE ON A TYPICAL DAY: PATIENT DOES NOT DRINK

## 2025-08-20 ASSESSMENT — PATIENT HEALTH QUESTIONNAIRE - PHQ9
SUM OF ALL RESPONSES TO PHQ QUESTIONS 1-9: 0
SUM OF ALL RESPONSES TO PHQ QUESTIONS 1-9: 0
1. LITTLE INTEREST OR PLEASURE IN DOING THINGS: NOT AT ALL
SUM OF ALL RESPONSES TO PHQ QUESTIONS 1-9: 0
2. FEELING DOWN, DEPRESSED OR HOPELESS: NOT AT ALL
DEPRESSION UNABLE TO ASSESS: PT REFUSES
SUM OF ALL RESPONSES TO PHQ QUESTIONS 1-9: 0

## 2025-08-22 DIAGNOSIS — E78.5 HYPERLIPIDEMIA, UNSPECIFIED HYPERLIPIDEMIA TYPE: ICD-10-CM

## 2025-08-22 RX ORDER — SIMVASTATIN 5 MG
5 TABLET ORAL NIGHTLY
Qty: 90 TABLET | Refills: 1 | Status: SHIPPED | OUTPATIENT
Start: 2025-08-22

## 2025-08-25 DIAGNOSIS — Z12.11 COLON CANCER SCREENING: Primary | ICD-10-CM

## 2025-08-25 LAB
CONTROL: NORMAL
FECAL BLOOD IMMUNOCHEMICAL TEST: NEGATIVE

## 2025-08-25 PROCEDURE — 82274 ASSAY TEST FOR BLOOD FECAL: CPT | Performed by: FAMILY MEDICINE
